# Patient Record
Sex: FEMALE | Race: WHITE | NOT HISPANIC OR LATINO | Employment: OTHER | URBAN - METROPOLITAN AREA
[De-identification: names, ages, dates, MRNs, and addresses within clinical notes are randomized per-mention and may not be internally consistent; named-entity substitution may affect disease eponyms.]

---

## 2017-03-30 ENCOUNTER — TRANSCRIBE ORDERS (OUTPATIENT)
Dept: ADMINISTRATIVE | Facility: HOSPITAL | Age: 82
End: 2017-03-30

## 2017-03-30 DIAGNOSIS — Z12.31 SCREENING MAMMOGRAM FOR HIGH-RISK PATIENT: Primary | ICD-10-CM

## 2017-05-23 ENCOUNTER — HOSPITAL ENCOUNTER (OUTPATIENT)
Dept: MAMMOGRAPHY | Facility: MEDICAL CENTER | Age: 82
Discharge: HOME | End: 2017-05-23
Payer: MEDICARE

## 2017-05-23 DIAGNOSIS — Z12.31 ENCOUNTER FOR SCREENING MAMMOGRAM FOR MALIGNANT NEOPLASM OF BREAST: ICD-10-CM

## 2017-05-23 PROCEDURE — G0202 SCR MAMMO BI INCL CAD: HCPCS

## 2017-06-01 ENCOUNTER — ALLSCRIPTS OFFICE VISIT (OUTPATIENT)
Dept: OTHER | Facility: OTHER | Age: 82
End: 2017-06-01

## 2017-07-26 ENCOUNTER — GENERIC CONVERSION - ENCOUNTER (OUTPATIENT)
Dept: OTHER | Facility: OTHER | Age: 82
End: 2017-07-26

## 2017-08-08 ENCOUNTER — GENERIC CONVERSION - ENCOUNTER (OUTPATIENT)
Dept: OTHER | Facility: OTHER | Age: 82
End: 2017-08-08

## 2018-01-12 NOTE — MISCELLANEOUS
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)   8  Lichen sclerosus (384 8) (L90 0)   9  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)   10  Pelvic Fracture (808 8)   11  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)   12  Post-menopause atrophic vaginitis (627 3) (N95 2)   13  Screening mammogram for high-risk patient (V76 11) (Z12 31)   14  Stress Incontinence (788 39)   15  Thyroid disorder (246 9) (E07 9)   16  Urinary incontinence (788 30) (R32)   17  Urinary tract infection (599 0) (N39 0)   18  Vaginal candidiasis (112 1) (B37 3)   19  Visit for screening mammogram (V76 12) (Z12 31)   20  Vulvar atrophy (624 1) (N90 5)    Current Meds   1  Adult Aspirin Low Strength 81 MG TBDP Recorded   2  Calcium Magnesium 750 TABS Recorded   3  Clobetasol Propionate 0 05 % External Cream; APPLY SPARINGLY TO AFFECTED   AREA(S) 3 TIMES A DAY; Therapy: 43SYB0656 to (Evaluate:03Jun2015); Last EV:[id number] Ordered   4  Desoximetasone 0 25 % External Cream (Topicort); APPLY AND GENTLY MASSAGE   INTO AFFECTED AREA(S) TWICE DAILY FOR 2 WEEKS ON AND ONE WEEK   OFF AS DIRECTED; Therapy: 08WGB4792 to (Last Rx:[id number])  Requested for: 86AHX5048 Ordered   5  Estrace 0 1 MG/GM Vaginal Cream; Insert 0 5 - 1 gm intravaginally 2 -3x/week as   directed; Therapy: 42NZV1184 to (Evaluate:13May2017)  Requested for: 44OGE9887; Last   Rx:18May2016 Ordered   6  Levothyroxine Sodium 75 MCG Oral Tablet Recorded   7  Lyrica 20 MG/ML Oral Solution; Therapy: 16QON6596 to (Evaluate:01Jun2014) Recorded   8  MiraLax Oral Packet (Polyethylene Glycol 3350) Recorded   9  Terconazole 0 4 % Vaginal Cream; INSERT 1 APPLICATORFUL INTRAVAGINALLY AT   BEDTIME; Therapy: 64BVW9767 to (Evaluate:18Apr2016)  Requested for: 49Cfs5049; Last   Rx:[id number] Ordered   10  Ultra Tiara-Time Oral Tablet Recorded   11  VESIcare 5 MG Oral Tablet; take 1 tablet by mouth daily; Therapy: 18AXZ1018 to (Evaluate:39Hhv2995)  Requested for: 35RVV9494; Last    Rx:[id number] Ordered   12  Vitamin B12 3000 MCG/ML Sublingual Liquid Recorded    Allergies    1  Penicillins    Plan  Vaginal candidiasis    · Terconazole 0 4 % Vaginal Cream (Terazol 7);  INSERT 1 APPLICATORFUL  INTRAVAGINALLY AT BEDTIME    Signatures   Electronically signed by : Jane Chauhan, ; Oct 12 2016  1:23PM EST                       (Author)

## 2018-01-12 NOTE — MISCELLANEOUS
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)   8  Lichen sclerosus (259 6) (L90 0)   9  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)   10  Pelvic Fracture (808 8)   11  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)   12  Post-menopause atrophic vaginitis (627 3) (N95 2)   13  Screening mammogram for high-risk patient (V76 11) (Z12 31)   14  Stress Incontinence (788 39)   15  Thyroid disorder (246 9) (E07 9)   16  Urinary incontinence (788 30) (R32)   17  Urinary tract infection (599 0) (N39 0)   18  Vaginal candidiasis (112 1) (B37 3)   19  Visit for screening mammogram (V76 12) (Z12 31)   20  Vulvar atrophy (624 1) (N90 5)    Current Meds   1  Adult Aspirin Low Strength 81 MG TBDP Recorded   2  Calcium Magnesium 750 TABS Recorded   3  Clobetasol Propionate 0 05 % External Cream; APPLY SPARINGLY TO AFFECTED   AREA(S) 3 TIMES A DAY; Therapy: 78AUL6443 to (Evaluate:03Jun2015); Last LB:[id number] Ordered   4  Desoximetasone 0 25 % External Cream (Topicort); APPLY AND GENTLY MASSAGE   INTO AFFECTED AREA(S) TWICE DAILY FOR 2 WEEKS ON AND ONE WEEK   OFF AS DIRECTED; Therapy: 81ZLG5662 to (Last Rx:[id number])  Requested for: 85KVC2230 Ordered   5  Levothyroxine Sodium 75 MCG Oral Tablet Recorded   6  Premarin 0 625 MG/GM Vaginal Cream; Insert 1/2 applicatorful  vaginally once a week   Recorded   7  Senokot TABS Recorded   8   Terconazole 0 4 % Vaginal Cream (Terazol 7); INSERT 1 APPLICATORFUL   INTRAVAGINALLY AT BEDTIME NIGHTLY; Therapy: 28IFI8119 to (Evaluate:08Jun2017)  Requested for: 58AMV6979; Last   Rx:01Jun2017 Ordered   9  Ultra Tiara-Time Oral Tablet Recorded   10  VESIcare 5 MG Oral Tablet; TAKE 1 TABLET DAILY AS DIRECTED Recorded   11  Vitamin B12 3000 MCG/ML Sublingual Liquid Recorded    Allergies    1   Penicillins    Plan  Vaginal candidiasis    · Fluconazole 150 MG Oral Tablet; tAKE ONE TABLET NOW AND THEN REPEAT IN  3 DAYS    Signatures   Electronically signed by : Tanya Tamayo, ; Jul 26 2017  1:26PM EST                       (Author)

## 2018-01-13 VITALS
WEIGHT: 122.5 LBS | SYSTOLIC BLOOD PRESSURE: 136 MMHG | DIASTOLIC BLOOD PRESSURE: 62 MMHG | BODY MASS INDEX: 20.92 KG/M2 | HEIGHT: 64 IN

## 2018-01-16 NOTE — MISCELLANEOUS
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 81)   4  Encounter for routine gynecological examination with Papanicolaou smear of cervix   (V72 31,V76 2) (Z01 419)   5  Encounter for screening mammogram for malignant neoplasm of breast (V76 12)   (Z12 31)   6  History of allergy (V15 09) (Z88 9)   7  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)   8  Lichen sclerosus (774 6) (L90 0)   9  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)   10  Pelvic Fracture (808 8)   11  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)   12  Post-menopause atrophic vaginitis (627 3) (N95 2)   13  Screening mammogram for high-risk patient (V76 11) (Z12 31)   14  Stress Incontinence (788 39)   15  Thyroid disorder (246 9) (E07 9)   16  Urinary incontinence (788 30) (R32)   17  Urinary tract infection (599 0) (N39 0)   18  Vaginal candidiasis (112 1) (B37 3)   19  Visit for screening mammogram (V76 12) (Z12 31)   20  Vulvar atrophy (624 1) (N90 5)    Current Meds   1  Adult Aspirin Low Strength 81 MG TBDP Recorded   2  Calcium Magnesium 750 TABS Recorded   3  Clobetasol Propionate 0 05 % External Cream; APPLY SPARINGLY TO AFFECTED   AREA(S) 3 TIMES A DAY; Therapy: 01AXX8582 to (Evaluate:03Jun2015); Last TN:[id number] Ordered   4  Desoximetasone 0 25 % External Cream (Topicort); APPLY AND GENTLY MASSAGE   INTO AFFECTED AREA(S) TWICE DAILY FOR 2 WEEKS ON AND ONE WEEK   OFF AS DIRECTED; Therapy: 52ABD8548 to (Last Rx:[id number])  Requested for: 24BQO4119 Ordered   5  Fluconazole 150 MG Oral Tablet; tAKE ONE TABLET NOW AND THEN REPEAT IN 3   DAYS;    Therapy: 28JBZ3100 to (Evaluate:61Njd3448)  Requested for: 05Ahg0823; Last   Rx:[id number] Ordered   6  Levothyroxine Sodium 75 MCG Oral Tablet Recorded   7  Premarin 0 625 MG/GM Vaginal Cream; Insert 1/2 applicatorful  vaginally once a week   Recorded   8  Senokot TABS Recorded   9  Terconazole 0 4 % Vaginal Cream (Terazol 7); INSERT 1 APPLICATORFUL   INTRAVAGINALLY AT BEDTIME NIGHTLY; Therapy: 04HVE5940 to (Evaluate:08Jun2017)  Requested for: 32UWD3970; Last   Rx:01Jun2017 Ordered   10  Ultra Tiara-Time Oral Tablet Recorded   11  VESIcare 5 MG Oral Tablet; TAKE 1 TABLET DAILY AS DIRECTED Recorded   12  Vitamin B12 3000 MCG/ML Sublingual Liquid Recorded    Allergies    1   Penicillins    Plan  Lichen sclerosus    · Clobetasol Propionate 0 05 % External Cream; APPLY SPARINGLY TO  AFFECTED AREA(S) 3 TIMES A DAY    Signatures   Electronically signed by : Tanya Tamayo, ; Aug  8 2017 12:52PM EST                       (Author)

## 2018-03-02 DIAGNOSIS — N89.8 VAGINAL DISCHARGE: Primary | ICD-10-CM

## 2018-03-02 DIAGNOSIS — N90.89 VULVAR IRRITATION: ICD-10-CM

## 2018-03-05 DIAGNOSIS — N32.81 OVERACTIVE BLADDER: Primary | ICD-10-CM

## 2018-03-05 RX ORDER — SOLIFENACIN SUCCINATE 5 MG/1
TABLET, FILM COATED ORAL
Qty: 90 TABLET | Refills: 3 | Status: SHIPPED | OUTPATIENT
Start: 2018-03-05 | End: 2019-01-12 | Stop reason: SDUPTHER

## 2018-03-27 ENCOUNTER — OFFICE VISIT (OUTPATIENT)
Dept: GYNECOLOGY | Facility: CLINIC | Age: 83
End: 2018-03-27
Payer: MEDICARE

## 2018-03-27 VITALS — DIASTOLIC BLOOD PRESSURE: 80 MMHG | SYSTOLIC BLOOD PRESSURE: 130 MMHG

## 2018-03-27 DIAGNOSIS — B37.3 CANDIDIASIS OF VULVA AND VAGINA: Primary | ICD-10-CM

## 2018-03-27 PROCEDURE — 87210 SMEAR WET MOUNT SALINE/INK: CPT | Performed by: OBSTETRICS & GYNECOLOGY

## 2018-03-27 PROCEDURE — 99213 OFFICE O/P EST LOW 20 MIN: CPT | Performed by: OBSTETRICS & GYNECOLOGY

## 2018-03-27 RX ORDER — SENNA PLUS 8.6 MG/1
TABLET ORAL
COMMUNITY

## 2018-03-27 RX ORDER — CYANOCOBALAMIN (VITAMIN B-12) 1000 MCG
TABLET, SUBLINGUAL SUBLINGUAL
COMMUNITY

## 2018-03-27 RX ORDER — LEVOTHYROXINE SODIUM 0.05 MG/1
TABLET ORAL
COMMUNITY
Start: 2018-03-19

## 2018-03-27 RX ORDER — FLUCONAZOLE 150 MG/1
TABLET ORAL
Qty: 2 TABLET | Refills: 0 | Status: SHIPPED | OUTPATIENT
Start: 2018-03-27 | End: 2018-03-29

## 2018-03-27 RX ORDER — ESTRADIOL 0.1 MG/G
CREAM VAGINAL
COMMUNITY
Start: 2018-02-22 | End: 2019-06-04 | Stop reason: SDUPTHER

## 2018-03-27 RX ORDER — CLOBETASOL PROPIONATE 0.5 MG/G
CREAM TOPICAL 3 TIMES DAILY
COMMUNITY
Start: 2015-06-02 | End: 2018-09-12 | Stop reason: SDUPTHER

## 2018-03-27 RX ORDER — SOLIFENACIN SUCCINATE 5 MG/1
1 TABLET, FILM COATED ORAL DAILY
COMMUNITY
End: 2020-04-08 | Stop reason: ALTCHOICE

## 2018-03-27 RX ORDER — MONTELUKAST SODIUM 10 MG/1
TABLET ORAL
COMMUNITY
Start: 2018-02-25

## 2018-03-27 RX ORDER — CLOTRIMAZOLE AND BETAMETHASONE DIPROPIONATE 10; .64 MG/G; MG/G
CREAM TOPICAL 2 TIMES DAILY
Qty: 30 G | Refills: 0 | Status: SHIPPED | OUTPATIENT
Start: 2018-03-27 | End: 2020-06-11 | Stop reason: SDUPTHER

## 2018-03-27 RX ORDER — AZELASTINE HCL 205.5 UG/1
SPRAY NASAL
COMMUNITY
Start: 2018-03-05

## 2018-03-27 NOTE — PROGRESS NOTES
And patient presents complaining of vulvar irritation and a vaginal discharge over the past 6 weeks  This is intermittent  Denies any dysuria hematuria abdominal pain or fever  She has had a prior POLLO BSO several years ago  She has a history of interstitial cystitis the now off medications  She also has a history of detrusor instability she is presently on VESIcare  She has history of lichen sclerosis  When this flare she uses Valisone with Eurax  She also has vaginal atrophy  She uses Estrace twice weekly for this  With this recent irritation and discharge she trialed Terazol 7 with minimal to no relief  Physical exam abdomen is soft nontender no adenopathy  On pelvic exam the uterus and cervix are absent there are no adnexal masses  Vaginal tissue looks well estrogenized  There is a discharge present  Wet mount reveals high Fe consistent with a candidiasis  The labia are erythematous      Impression vulvar vaginal candidiasis    Plan:  Diflucan 1 tablet now; repeat 48 hours , and Lotrisone applied topically b i d  for 7 days

## 2018-04-27 ENCOUNTER — TRANSCRIBE ORDERS (OUTPATIENT)
Dept: ADMINISTRATIVE | Facility: HOSPITAL | Age: 83
End: 2018-04-27

## 2018-04-27 DIAGNOSIS — Z12.39 SCREENING BREAST EXAMINATION: Primary | ICD-10-CM

## 2018-05-10 ENCOUNTER — OFFICE VISIT (OUTPATIENT)
Dept: GYNECOLOGY | Facility: CLINIC | Age: 83
End: 2018-05-10
Payer: MEDICARE

## 2018-05-10 VITALS
BODY MASS INDEX: 21.58 KG/M2 | HEIGHT: 64 IN | DIASTOLIC BLOOD PRESSURE: 60 MMHG | WEIGHT: 126.4 LBS | SYSTOLIC BLOOD PRESSURE: 140 MMHG

## 2018-05-10 DIAGNOSIS — N95.2 ATROPHIC VAGINITIS: Primary | ICD-10-CM

## 2018-05-10 DIAGNOSIS — B37.9 CANDIDIASIS: ICD-10-CM

## 2018-05-10 DIAGNOSIS — R39.9 URINARY TRACT INFECTION SYMPTOMS: ICD-10-CM

## 2018-05-10 LAB
SL AMB  POCT GLUCOSE, UA: NORMAL
SL AMB LEUKOCYTE ESTERASE,UA: NORMAL
SL AMB POCT BILIRUBIN,UA: NORMAL
SL AMB POCT BLOOD,UA: NORMAL
SL AMB POCT CLARITY,UA: CLEAR
SL AMB POCT COLOR,UA: YELLOW
SL AMB POCT KETONES,UA: NORMAL
SL AMB POCT NITRITE,UA: NORMAL
SL AMB POCT PH,UA: 7
SL AMB POCT SPECIFIC GRAVITY,UA: 1
SL AMB POCT URINE PROTEIN: NORMAL
SL AMB POCT UROBILINOGEN: NORMAL

## 2018-05-10 PROCEDURE — 81002 URINALYSIS NONAUTO W/O SCOPE: CPT | Performed by: OBSTETRICS & GYNECOLOGY

## 2018-05-10 PROCEDURE — 99213 OFFICE O/P EST LOW 20 MIN: CPT | Performed by: OBSTETRICS & GYNECOLOGY

## 2018-05-10 RX ORDER — LIDOCAINE 50 MG/G
OINTMENT TOPICAL 3 TIMES DAILY PRN
Qty: 35.44 G | Refills: 0 | Status: SHIPPED | OUTPATIENT
Start: 2018-05-10 | End: 2020-04-08 | Stop reason: ALTCHOICE

## 2018-05-10 RX ORDER — FLUCONAZOLE 150 MG/1
TABLET ORAL
Qty: 4 TABLET | Refills: 0 | Status: SHIPPED | OUTPATIENT
Start: 2018-05-10 | End: 2018-05-19

## 2018-05-10 NOTE — PROGRESS NOTES
Presents today complaining of significant vaginal burning and irritation  She was seen on March 27th and was treated for candidiasis with the Diflucan x2 and Lotrisone  Patient has had a prior POLLO BS O  She has a history of interstitial cystitis, lichen sclerosis, and vaginal atrophy  She uses Estrace 1 g vaginally 2 times per week  She denies any abdominal pain fever vaginal bleeding  She does admit to discharge  Physical exam:  Abdomen is soft and nontender with no masses appreciated  Pelvic exam:  Uterus and cervix are absent there are no palpable adnexal masses  There is a discharge present consistent with candidiasis  A sugar swab culture was obtained  Vaginal tissue appears well estrogenized  Impression vulvar vaginal candidiasis    Plan Diflucan 1 tablet every 3rd day for 4 doses  Topical lidocaine p r n     We will await the results of the Essure swab if the cultures returned as negative and the patient continues to have discomfort would recommend consultation at [facility]

## 2018-05-14 LAB
A VAGINAE DNA VAG NAA+PROBE-LOG#: NOT DETECTED LOG (CELLS/ML)
C GLABRATA DNA VAG QL NAA+PROBE: NOT DETECTED
C TRACH RRNA SPEC QL NAA+PROBE: NOT DETECTED
CANDIDA DNA VAG QL NAA+PROBE: NOT DETECTED
G VAGINALIS DNA VAG NAA+PROBE-LOG#: NOT DETECTED LOG (CELLS/ML)
LACTOBACILLUS DNA VAG NAA+PROBE-LOG#: 7 LOG (CELLS/ML)
MEGASPHAERA SP DNA VAG NAA+PROBE-LOG#: NOT DETECTED LOG (CELLS/ML)
N GONORRHOEA RRNA SPEC QL NAA+PROBE: NOT DETECTED
SL AMB BV CATEGORY:: NORMAL
SL AMB C. PARAPSILOSIS, DNA: NOT DETECTED
SL AMB C. TROPICALIS, DNA: NOT DETECTED
T VAGINALIS RRNA SPEC QL NAA+PROBE: NOT DETECTED

## 2018-05-24 ENCOUNTER — HOSPITAL ENCOUNTER (OUTPATIENT)
Dept: MAMMOGRAPHY | Facility: MEDICAL CENTER | Age: 83
Discharge: HOME | End: 2018-05-24
Payer: MEDICARE

## 2018-05-24 DIAGNOSIS — Z12.39 SCREENING BREAST EXAMINATION: ICD-10-CM

## 2018-05-24 PROCEDURE — 77067 SCR MAMMO BI INCL CAD: CPT

## 2018-09-10 ENCOUNTER — DOCUMENTATION (OUTPATIENT)
Dept: GYNECOLOGY | Facility: CLINIC | Age: 83
End: 2018-09-10

## 2018-09-10 NOTE — PROGRESS NOTES
Pt called c/o vaginal soreness and irritation urine burms, slighrt yellow d/c and slight odor  Spoke to [place] she advised trying the Valisone Eurac cream for several days and call if no better       Taran Bernal LPN

## 2018-09-12 DIAGNOSIS — L90.0 LICHEN SCLEROSUS: Primary | ICD-10-CM

## 2018-09-12 RX ORDER — CLOBETASOL PROPIONATE 0.5 MG/G
CREAM TOPICAL 2 TIMES DAILY
Qty: 30 G | Refills: 1 | Status: SHIPPED | OUTPATIENT
Start: 2018-09-12 | End: 2019-06-04 | Stop reason: HOSPADM

## 2018-10-02 ENCOUNTER — OFFICE VISIT (OUTPATIENT)
Dept: GYNECOLOGY | Facility: CLINIC | Age: 83
End: 2018-10-02
Payer: MEDICARE

## 2018-10-02 VITALS
HEIGHT: 64 IN | SYSTOLIC BLOOD PRESSURE: 130 MMHG | BODY MASS INDEX: 20.59 KG/M2 | WEIGHT: 120.6 LBS | DIASTOLIC BLOOD PRESSURE: 70 MMHG

## 2018-10-02 DIAGNOSIS — N76.0 VAGINITIS AND VULVOVAGINITIS: Primary | ICD-10-CM

## 2018-10-02 PROCEDURE — 99213 OFFICE O/P EST LOW 20 MIN: CPT | Performed by: OBSTETRICS & GYNECOLOGY

## 2018-10-02 RX ORDER — FLUCONAZOLE 150 MG/1
TABLET ORAL
Qty: 4 TABLET | Refills: 0 | Status: SHIPPED | OUTPATIENT
Start: 2018-10-02 | End: 2018-10-11

## 2018-10-02 NOTE — PROGRESS NOTES
Patient complete presents today complaining of vaginal burning for the past 6 weeks  She has a history of chronic vaginitis lichen sclerosis and atrophic vaginitis  She is presently on Estrace vaginal cream 1 g weekly  She was last seen in May with the vaginal burning and appeared to be secondary to candidiasis was placed on Diflucan  The sureswab culture at that time however came back negative  Patient has had a prior POLLO BSO   she denies any vaginal discharge or bleeding, abdominal pain, or fever ,or hematuria  On exam there is vaginal discharge again consistent with candidiasis  A culture was obtained  And the vaginal tissue appears to be well estrogenized       Impression:  Vaginitis :suspected candidiasis    Plan Diflucan 1 tablet every 3rd day for 4 doses if no improvement with this I would recommend consultation at [facility] with Dr Darlene Mims

## 2018-10-05 LAB
A VAGINAE DNA VAG NAA+PROBE-LOG#: NOT DETECTED LOG (CELLS/ML)
C GLABRATA DNA VAG QL NAA+PROBE: NOT DETECTED
C TRACH RRNA SPEC QL NAA+PROBE: NOT DETECTED
CANDIDA DNA VAG QL NAA+PROBE: DETECTED
G VAGINALIS DNA VAG NAA+PROBE-LOG#: NOT DETECTED LOG (CELLS/ML)
LACTOBACILLUS DNA VAG NAA+PROBE-LOG#: 7.4 LOG (CELLS/ML)
MEGASPHAERA SP DNA VAG NAA+PROBE-LOG#: NOT DETECTED LOG (CELLS/ML)
N GONORRHOEA RRNA SPEC QL NAA+PROBE: NOT DETECTED
SL AMB BV CATEGORY:: ABNORMAL
SL AMB C. PARAPSILOSIS, DNA: NOT DETECTED
SL AMB C. TROPICALIS, DNA: NOT DETECTED
T VAGINALIS RRNA SPEC QL NAA+PROBE: NOT DETECTED

## 2019-01-12 DIAGNOSIS — N32.81 OVERACTIVE BLADDER: ICD-10-CM

## 2019-01-13 RX ORDER — SOLIFENACIN SUCCINATE 5 MG/1
TABLET, FILM COATED ORAL
Qty: 90 TABLET | Refills: 3 | Status: SHIPPED | OUTPATIENT
Start: 2019-01-13 | End: 2019-06-04 | Stop reason: SDUPTHER

## 2019-03-27 ENCOUNTER — TRANSCRIBE ORDERS (OUTPATIENT)
Dept: ADMINISTRATIVE | Facility: HOSPITAL | Age: 84
End: 2019-03-27

## 2019-03-27 DIAGNOSIS — Z12.39 SCREENING BREAST EXAMINATION: Primary | ICD-10-CM

## 2019-05-28 ENCOUNTER — HOSPITAL ENCOUNTER (OUTPATIENT)
Dept: MAMMOGRAPHY | Facility: MEDICAL CENTER | Age: 84
Discharge: HOME | End: 2019-05-28
Payer: MEDICARE

## 2019-05-28 VITALS — WEIGHT: 120 LBS | HEIGHT: 65 IN | BODY MASS INDEX: 19.99 KG/M2

## 2019-05-28 DIAGNOSIS — Z12.39 SCREENING BREAST EXAMINATION: ICD-10-CM

## 2019-05-28 PROCEDURE — 77067 SCR MAMMO BI INCL CAD: CPT

## 2019-06-04 ENCOUNTER — ANNUAL EXAM (OUTPATIENT)
Dept: GYNECOLOGY | Facility: CLINIC | Age: 84
End: 2019-06-04
Payer: MEDICARE

## 2019-06-04 VITALS
DIASTOLIC BLOOD PRESSURE: 60 MMHG | HEIGHT: 64 IN | BODY MASS INDEX: 20.83 KG/M2 | SYSTOLIC BLOOD PRESSURE: 100 MMHG | WEIGHT: 122 LBS | HEART RATE: 73 BPM

## 2019-06-04 DIAGNOSIS — N95.2 ATROPHIC VAGINITIS: Primary | ICD-10-CM

## 2019-06-04 DIAGNOSIS — Z01.419 ENCOUNTER FOR GYNECOLOGICAL EXAMINATION WITHOUT ABNORMAL FINDING: ICD-10-CM

## 2019-06-04 DIAGNOSIS — N32.81 OAB (OVERACTIVE BLADDER): ICD-10-CM

## 2019-06-04 DIAGNOSIS — N32.81 OVERACTIVE BLADDER: ICD-10-CM

## 2019-06-04 PROCEDURE — G0101 CA SCREEN;PELVIC/BREAST EXAM: HCPCS | Performed by: OBSTETRICS & GYNECOLOGY

## 2019-06-04 RX ORDER — SOLIFENACIN SUCCINATE 5 MG/1
5 TABLET, FILM COATED ORAL DAILY
Qty: 90 TABLET | Refills: 3 | Status: SHIPPED | OUTPATIENT
Start: 2019-06-04 | End: 2020-04-08 | Stop reason: ALTCHOICE

## 2019-06-04 RX ORDER — ESTRADIOL 0.1 MG/G
1 CREAM VAGINAL WEEKLY
Qty: 42.5 G | Refills: 3 | Status: SHIPPED | OUTPATIENT
Start: 2019-06-04 | End: 2020-11-11 | Stop reason: SDUPTHER

## 2020-03-23 ENCOUNTER — OFFICE VISIT (OUTPATIENT)
Dept: URGENT CARE | Age: 85
End: 2020-03-23
Payer: MEDICARE

## 2020-03-23 VITALS
DIASTOLIC BLOOD PRESSURE: 84 MMHG | OXYGEN SATURATION: 98 % | HEART RATE: 84 BPM | SYSTOLIC BLOOD PRESSURE: 110 MMHG | BODY MASS INDEX: 20.83 KG/M2 | TEMPERATURE: 98.8 F | HEIGHT: 64 IN | WEIGHT: 122 LBS

## 2020-03-23 DIAGNOSIS — R06.02 SHORTNESS OF BREATH: ICD-10-CM

## 2020-03-23 DIAGNOSIS — R06.02 SOB (SHORTNESS OF BREATH): Primary | ICD-10-CM

## 2020-03-23 PROCEDURE — 87635 SARS-COV-2 COVID-19 AMP PRB: CPT | Performed by: NURSE PRACTITIONER

## 2020-03-23 PROCEDURE — 99213 OFFICE O/P EST LOW 20 MIN: CPT | Performed by: NURSE PRACTITIONER

## 2020-03-23 PROCEDURE — 87631 RESP VIRUS 3-5 TARGETS: CPT | Performed by: NURSE PRACTITIONER

## 2020-03-23 PROCEDURE — G0463 HOSPITAL OUTPT CLINIC VISIT: HCPCS | Performed by: NURSE PRACTITIONER

## 2020-03-23 RX ORDER — AZITHROMYCIN 250 MG/1
TABLET, FILM COATED ORAL
COMMUNITY
Start: 2020-03-16 | End: 2020-04-08 | Stop reason: ALTCHOICE

## 2020-03-23 NOTE — PATIENT INSTRUCTIONS
Pt aware to follow up with pcp   Aware to continue quarintine at home as directed   Will call with results call your pcp if anything worsens      COVID-19 Home Care Guidelines    Your healthcare provider and/or public health staff have evaluated you and have determined that you do not need to remain in the hospital at this time  At this time you can be isolated at home where you will be monitored by staff from your local or state health department  You should carefully follow the prevention and isolation steps below until a healthcare provider or local or state health department says that you can return to your normal activities  Stay home except to get medical care    People who are mildly ill with COVID-19 are able to isolate at home during their illness  You should restrict activities outside your home, except for getting medical care  Do not go to work, school, or public areas  Avoid using public transportation, ride-sharing, or taxis  Separate yourself from other people and animals in your home    People: As much as possible, you should stay in a specific room and away from other people in your home  Also, you should use a separate bathroom, if available  Animals: You should restrict contact with pets and other animals while you are sick with COVID-19, just like you would around other people  Although there have not been reports of pets or other animals becoming sick with COVID-19, it is still recommended that people sick with COVID-19 limit contact with animals until more information is known about the virus  When possible, have another member of your household care for your animals while you are sick  If you are sick with COVID-19, avoid contact with your pet, including petting, snuggling, being kissed or licked, and sharing food  If you must care for your pet or be around animals while you are sick, wash your hands before and after you interact with pets and wear a facemask   See COVID-19 and Animals for more information  Call ahead before visiting your doctor    If you have a medical appointment, call the healthcare provider and tell them that you have or may have COVID-19  This will help the healthcare providers office take steps to keep other people from getting infected or exposed  Wear a facemask    You should wear a facemask when you are around other people (e g , sharing a room or vehicle) or pets and before you enter a healthcare providers office  If you are not able to wear a facemask (for example, because it causes trouble breathing), then people who live with you should not stay in the same room with you, or they should wear a facemask if they enter your room  Cover your coughs and sneezes    Cover your mouth and nose with a tissue when you cough or sneeze  Throw used tissues in a lined trash can  Immediately wash your hands with soap and water for at least 20 seconds or, if soap and water are not available, clean your hands with an alcohol-based hand  that contains at least 60% alcohol  Clean your hands often    Wash your hands often with soap and water for at least 20 seconds, especially after blowing your nose, coughing, or sneezing; going to the bathroom; and before eating or preparing food  If soap and water are not readily available, use an alcohol-based hand  with at least 60% alcohol, covering all surfaces of your hands and rubbing them together until they feel dry  Soap and water are the best option if hands are visibly dirty  Avoid touching your eyes, nose, and mouth with unwashed hands  Avoid sharing personal household items    You should not share dishes, drinking glasses, cups, eating utensils, towels, or bedding with other people or pets in your home  After using these items, they should be washed thoroughly with soap and water      Clean all high-touch surfaces everyday    High touch surfaces include counters, tabletops, doorknobs, bathroom fixtures, toilets, phones, keyboards, tablets, and bedside tables  Also, clean any surfaces that may have blood, stool, or body fluids on them  Use a household cleaning spray or wipe, according to the label instructions  Labels contain instructions for safe and effective use of the cleaning product including precautions you should take when applying the product, such as wearing gloves and making sure you have good ventilation during use of the product  Monitor your symptoms    Seek prompt medical attention if your illness is worsening (e g , difficulty breathing)  Before seeking care, call your healthcare provider and tell them that you have, or are being evaluated for, COVID-19  Put on a facemask before you enter the facility  These steps will help the healthcare providers office to keep other people in the office or waiting room from getting infected or exposed  Ask your healthcare provider to call the local or state health department  Persons who are placed under active monitoring or facilitated self-monitoring should follow instructions provided by their local health department or occupational health professionals, as appropriate  If you have a medical emergency and need to call 911, notify the dispatch personnel that you have, or are being evaluated for COVID-19  If possible, put on a facemask before emergency medical services arrive  Discontinuing home isolation    Patients with confirmed COVID-19 should remain under home isolation precautions until the risk of secondary transmission to others is thought to be low  The decision to discontinue home isolation precautions should be made on a case-by-case basis, in consultation with healthcare providers and state and local health departments      Source: [employer]

## 2020-03-23 NOTE — PROGRESS NOTES
NAME: Natalia Scott is a 82 y o  female  : 1935    MRN: [id number]    /84   Pulse 84   Temp 98 8 °F (37 1 °C)   Ht 5' 4" (1 626 m)   Wt 55 3 kg (122 lb)   SpO2 98%   BMI 20 94 kg/m²     Assessment and Plan   SOB (shortness of breath) [R06 02]  1  SOB (shortness of breath)  MISC COVID-19 TEST- Office Collection    Influenza A/B and RSV by PCR   2  Shortness of breath         Clotilde Michelle was seen today for shortness of breath  Diagnoses and all orders for this visit:    SOB (shortness of breath)  -     MISC COVID-19 TEST- Office Collection  -     Influenza A/B and RSV by PCR    Shortness of breath        Patient Instructions   There are no Patient Instructions on file for this visit  Proceed to ER if symptoms worsen  Chief Complaint     Chief Complaint   Patient presents with    Shortness of Breath     Pt states she began yesterday with intermittent SOB and chest tightness  Denies cough or fevers  Recently treated for URI symptoms and completed Zithromax  No recent travel or exposure to positive Covid patient  History of Present Illness     83 yo female here today with sob and cough for the past few days  She finished z-anita Friday and yesterday she developed with mild SOB and CP, tightness which is intermittent  Worse when laying down  She has no other symptoms  She has some body aches and fatigue for about two weeks ago, she has had a flu shot this season as well  Currently at time of visit she doesn't have any symptoms and admits that she just wants to be sure that she doesn't have the COVID virus  Discussed with pt that she has no cough currently, no fever but due to her age and risks, and tightness that intermittently comes and goes I would test her for it  She denies having any numbness or tingling to the body and has no other symptoms  Pt is sitting down in chair across the room and reading a book with no discomfort   Patient was sent to the office by her PCP to have COVID testing and no order was placed in  Review of Systems   Review of Systems   Constitutional: Negative for chills and fever  HENT: Positive for congestion, sinus pressure and sinus pain  Negative for ear pain, postnasal drip and sore throat  Eyes: Negative  Respiratory: Positive for cough and chest tightness  Gastrointestinal: Negative  Musculoskeletal: Negative            Current Medications       Current Outpatient Medications:     Aspirin 81 MG EC tablet, Take by mouth, Disp: , Rfl:     Azelastine HCl 0 15 % SOLN, , Disp: , Rfl:     Calcium-Magnesium (CALCIUM MAGNESIUM 750) 300-300 MG TABS, Take by mouth, Disp: , Rfl:     Cyanocobalamin (VITAMIN B-12) 500 MCG SUBL, Place under the tongue, Disp: , Rfl:     estradiol (ESTRACE) 0 1 mg/g vaginal cream, Insert 1 g into the vagina once a week, Disp: 42 5 g, Rfl: 3    levothyroxine 50 mcg tablet, , Disp: , Rfl:     Methylcellulose, Laxative, (CITRUCEL PO), Take by mouth, Disp: , Rfl:     montelukast (SINGULAIR) 10 mg tablet, , Disp: , Rfl:     senna (SENOKOT) 8 6 MG tablet, Take by mouth, Disp: , Rfl:     solifenacin (VESICARE) 5 mg tablet, Take 1 tablet by mouth daily, Disp: , Rfl:     solifenacin (VESICARE) 5 mg tablet, Take 1 tablet (5 mg total) by mouth daily, Disp: 90 tablet, Rfl: 3    azithromycin (ZITHROMAX) 250 mg tablet, , Disp: , Rfl:     clotrimazole-betamethasone (LOTRISONE) 1-0 05 % cream, Apply topically 2 (two) times a day (Patient not taking: Reported on 6/4/2019), Disp: 30 g, Rfl: 0    Lactobacillus (EQL DIGESTIVE PROBIOTIC PO), Take by mouth, Disp: , Rfl:     lidocaine (XYLOCAINE) 5 % ointment, Apply topically 3 (three) times a day as needed for mild pain (Patient not taking: Reported on 10/2/2018 ), Disp: 35 44 g, Rfl: 0    Current Allergies     Allergies as of 03/23/2020 - Reviewed 03/23/2020   Allergen Reaction Noted    Cephalexin Nausea Only and Vomiting 06/04/2019    Codeine Other (See Comments) 01/17/2008    Meperidine  01/17/2008    Other Other (See Comments) 01/18/2008    Penicillins  01/17/2008              Past Medical History:   Diagnosis Date    Allergic     Asthma     Chronic interstitial cystitis     Disease of thyroid gland     Lichen sclerosus et atrophicus        Past Surgical History:   Procedure Laterality Date    APPENDECTOMY      BLADDER SURGERY      CATARACT EXTRACTION      COLONOSCOPY      INCISIONAL BREAST BIOPSY Bilateral     3 excisional rt breast 4 left breast all bening many years ago    KNEE SURGERY      REPLACEMENT TOTAL KNEE      TOTAL ABDOMINAL HYSTERECTOMY  1972       Family History   Problem Relation Age of Onset    Heart attack Mother    Minesh Hoobed Breast cancer Mother         over 48    Heart disease Father     Lung cancer Father     Breast cancer Cousin         under 48    No Known Problems Sister     No Known Problems Daughter     No Known Problems Maternal Grandmother     No Known Problems Maternal Grandfather     No Known Problems Paternal Grandmother     No Known Problems Paternal Grandfather     No Known Problems Maternal Aunt          Medications have been verified  The following portions of the patient's history were reviewed and updated as appropriate: allergies, current medications, past family history, past medical history, past social history, past surgical history and problem list     Objective   /84   Pulse 84   Temp 98 8 °F (37 1 °C)   Ht 5' 4" (1 626 m)   Wt 55 3 kg (122 lb)   SpO2 98%   BMI 20 94 kg/m²      Physical Exam     Physical Exam   Constitutional: She is oriented to person, place, and time  She appears well-developed and well-nourished  She is cooperative  She does not appear ill  No distress  HENT:   Head: Normocephalic  Right Ear: Hearing and tympanic membrane normal    Left Ear: Hearing and tympanic membrane normal    Nose: Mucosal edema present     Mouth/Throat: Uvula is midline, oropharynx is clear and moist and mucous membranes are normal  No posterior oropharyngeal edema or posterior oropharyngeal erythema  No tonsillar exudate  Neck: Normal range of motion  No spinous process tenderness and no muscular tenderness present  Normal range of motion present  Cardiovascular: Normal rate, regular rhythm and normal heart sounds  Pulmonary/Chest: Effort normal and breath sounds normal  No stridor  No respiratory distress  She has no decreased breath sounds  Lymphadenopathy:     She has no cervical adenopathy  Neurological: She is alert and oriented to person, place, and time  Skin: Skin is warm  Capillary refill takes less than 2 seconds         AFUA Denise

## 2020-03-24 LAB
FLUAV RNA NPH QL NAA+PROBE: NORMAL
FLUBV RNA NPH QL NAA+PROBE: NORMAL
RSV RNA NPH QL NAA+PROBE: NORMAL

## 2020-03-25 ENCOUNTER — TELEPHONE (OUTPATIENT)
Dept: URGENT CARE | Age: 85
End: 2020-03-25

## 2020-03-25 NOTE — TELEPHONE ENCOUNTER
Pt made aware of her negative flu results today and that the COVID testing is still pending     Jace Cockayne, CRNP

## 2020-03-28 ENCOUNTER — TELEPHONE (OUTPATIENT)
Dept: URGENT CARE | Facility: CLINIC | Age: 85
End: 2020-03-28

## 2020-03-28 LAB — SARS-COV-2 RNA SPEC QL NAA+PROBE: NOT DETECTED

## 2020-03-28 NOTE — TELEPHONE ENCOUNTER
Called pt and spoke with her directly  Pt is aware that her results are negative and was relieved     AFUA De Leon

## 2020-04-08 ENCOUNTER — OFFICE VISIT (OUTPATIENT)
Dept: GYNECOLOGY | Facility: CLINIC | Age: 85
End: 2020-04-08
Payer: MEDICARE

## 2020-04-08 VITALS
SYSTOLIC BLOOD PRESSURE: 116 MMHG | DIASTOLIC BLOOD PRESSURE: 78 MMHG | BODY MASS INDEX: 21.31 KG/M2 | WEIGHT: 124.8 LBS | HEART RATE: 74 BPM | HEIGHT: 64 IN

## 2020-04-08 DIAGNOSIS — L90.0 LICHEN SCLEROSUS: ICD-10-CM

## 2020-04-08 DIAGNOSIS — Z12.31 ENCOUNTER FOR SCREENING MAMMOGRAM FOR BREAST CANCER: Primary | ICD-10-CM

## 2020-04-08 DIAGNOSIS — B37.3 MONILIAL VULVOVAGINITIS: Primary | ICD-10-CM

## 2020-04-08 LAB
BV WHIFF TEST VAG QL: ABNORMAL
CLUE CELLS SPEC QL WET PREP: ABNORMAL
PH SMN: 4.5 [PH]
SL AMB POCT WET MOUNT: ABNORMAL
T VAGINALIS VAG QL WET PREP: ABNORMAL
YEAST VAG QL WET PREP: ABNORMAL

## 2020-04-08 PROCEDURE — 99213 OFFICE O/P EST LOW 20 MIN: CPT | Performed by: OBSTETRICS & GYNECOLOGY

## 2020-04-08 PROCEDURE — 87210 SMEAR WET MOUNT SALINE/INK: CPT | Performed by: OBSTETRICS & GYNECOLOGY

## 2020-04-08 RX ORDER — CLOBETASOL PROPIONATE 0.5 MG/G
CREAM TOPICAL WEEKLY
Qty: 30 G | Refills: 0 | Status: SHIPPED | OUTPATIENT
Start: 2020-04-08 | End: 2021-10-29 | Stop reason: SDUPTHER

## 2020-04-08 RX ORDER — FLUCONAZOLE 150 MG/1
TABLET ORAL
Qty: 2 TABLET | Refills: 0 | Status: SHIPPED | OUTPATIENT
Start: 2020-04-08 | End: 2020-04-12

## 2020-06-05 ENCOUNTER — HOSPITAL ENCOUNTER (OUTPATIENT)
Dept: MAMMOGRAPHY | Facility: MEDICAL CENTER | Age: 85
Discharge: HOME | End: 2020-06-05
Payer: MEDICARE

## 2020-06-05 VITALS — WEIGHT: 124 LBS | BODY MASS INDEX: 21.17 KG/M2 | HEIGHT: 64 IN

## 2020-06-05 DIAGNOSIS — Z12.31 ENCOUNTER FOR SCREENING MAMMOGRAM FOR BREAST CANCER: ICD-10-CM

## 2020-06-05 PROCEDURE — 77063 BREAST TOMOSYNTHESIS BI: CPT

## 2020-06-05 PROCEDURE — 77067 SCR MAMMO BI INCL CAD: CPT

## 2020-06-11 ENCOUNTER — ANNUAL EXAM (OUTPATIENT)
Dept: GYNECOLOGY | Facility: CLINIC | Age: 85
End: 2020-06-11
Payer: MEDICARE

## 2020-06-11 VITALS
SYSTOLIC BLOOD PRESSURE: 150 MMHG | BODY MASS INDEX: 20.67 KG/M2 | DIASTOLIC BLOOD PRESSURE: 70 MMHG | HEART RATE: 83 BPM | WEIGHT: 120.4 LBS

## 2020-06-11 DIAGNOSIS — B37.3 CANDIDIASIS OF VULVA AND VAGINA: Primary | ICD-10-CM

## 2020-06-11 DIAGNOSIS — B37.9 CANDIDIASIS: ICD-10-CM

## 2020-06-11 PROCEDURE — 99213 OFFICE O/P EST LOW 20 MIN: CPT | Performed by: OBSTETRICS & GYNECOLOGY

## 2020-06-11 RX ORDER — NAPROXEN SODIUM 220 MG
220 TABLET ORAL 2 TIMES DAILY WITH MEALS
COMMUNITY
End: 2022-01-28 | Stop reason: HOSPADM

## 2020-06-11 RX ORDER — CLOTRIMAZOLE AND BETAMETHASONE DIPROPIONATE 10; .64 MG/G; MG/G
CREAM TOPICAL 2 TIMES DAILY
Qty: 30 G | Refills: 0 | Status: SHIPPED | OUTPATIENT
Start: 2020-06-11 | End: 2021-09-17 | Stop reason: SDUPTHER

## 2020-06-11 RX ORDER — FLUCONAZOLE 150 MG/1
TABLET ORAL
Qty: 2 TABLET | Refills: 0 | Status: SHIPPED | OUTPATIENT
Start: 2020-06-11 | End: 2020-06-13

## 2020-11-11 DIAGNOSIS — N95.2 ATROPHIC VAGINITIS: ICD-10-CM

## 2020-11-11 RX ORDER — ESTRADIOL 0.1 MG/G
1 CREAM VAGINAL WEEKLY
Qty: 42.5 G | Refills: 3 | Status: SHIPPED | OUTPATIENT
Start: 2020-11-11 | End: 2022-06-28 | Stop reason: SDUPTHER

## 2021-05-20 ENCOUNTER — TRANSCRIBE ORDERS (OUTPATIENT)
Dept: ADMINISTRATIVE | Facility: HOSPITAL | Age: 86
End: 2021-05-20

## 2021-05-20 DIAGNOSIS — Z12.31 ENCOUNTER FOR SCREENING MAMMOGRAM FOR MALIGNANT NEOPLASM OF BREAST: Primary | ICD-10-CM

## 2021-06-17 ENCOUNTER — HOSPITAL ENCOUNTER (OUTPATIENT)
Dept: MAMMOGRAPHY | Facility: MEDICAL CENTER | Age: 86
Discharge: HOME | End: 2021-06-17
Payer: MEDICARE

## 2021-06-17 VITALS — WEIGHT: 120 LBS | HEIGHT: 65 IN | BODY MASS INDEX: 19.99 KG/M2

## 2021-06-17 DIAGNOSIS — Z12.31 ENCOUNTER FOR SCREENING MAMMOGRAM FOR MALIGNANT NEOPLASM OF BREAST: ICD-10-CM

## 2021-06-17 PROCEDURE — 77067 SCR MAMMO BI INCL CAD: CPT

## 2021-06-17 PROCEDURE — 77063 BREAST TOMOSYNTHESIS BI: CPT

## 2021-09-17 ENCOUNTER — OFFICE VISIT (OUTPATIENT)
Dept: GYNECOLOGY | Facility: CLINIC | Age: 86
End: 2021-09-17
Payer: MEDICARE

## 2021-09-17 DIAGNOSIS — B37.3 MONILIAL VULVOVAGINITIS: ICD-10-CM

## 2021-09-17 DIAGNOSIS — B37.3 CANDIDIASIS OF VULVA AND VAGINA: Primary | ICD-10-CM

## 2021-09-17 PROBLEM — E11.51 DIABETES MELLITUS WITH PERIPHERAL CIRCULATORY DISORDER (HCC): Status: ACTIVE | Noted: 2021-09-17

## 2021-09-17 PROCEDURE — 99213 OFFICE O/P EST LOW 20 MIN: CPT | Performed by: OBSTETRICS & GYNECOLOGY

## 2021-09-17 RX ORDER — FLUCONAZOLE 150 MG/1
150 TABLET ORAL ONCE
Qty: 2 TABLET | Refills: 0 | Status: SHIPPED | OUTPATIENT
Start: 2021-09-17 | End: 2021-09-17

## 2021-09-17 RX ORDER — CLOTRIMAZOLE AND BETAMETHASONE DIPROPIONATE 10; .64 MG/G; MG/G
CREAM TOPICAL 2 TIMES DAILY
Qty: 30 G | Refills: 0 | Status: SHIPPED | OUTPATIENT
Start: 2021-09-17

## 2021-09-17 NOTE — PROGRESS NOTES
Assessment/Plan:    Will treat with oral Diflucan day 1 and 4 as well as lotrisone cream bid for 1-2 weeks  Lotrisone has worked for patient in the past      Diagnoses and all orders for this visit:    Candidiasis of vulva and vagina  -     clotrimazole-betamethasone (LOTRISONE) 1-0 05 % cream; Apply topically 2 (two) times a day    Monilial vulvovaginitis  -     fluconazole (DIFLUCAN) 150 mg tablet; Take 1 tablet (150 mg total) by mouth once for 1 dose Take day 1 and 4        Subjective:      Patient ID: Brooke Hay is a 82 y o  female  Patient presents with vulvovaginal irritation, itching burning with discharge for 6 weeks since she used an antibiotic for dental work  She has not used any otc treatment and only uses estrace cream once per week  She has used lotrisone in the past, but not recently  No other gyn concerns  The following portions of the patient's history were reviewed and updated as appropriate: allergies, current medications, past family history, past medical history, past social history, past surgical history and problem list     Review of Systems   Constitutional: Negative  HENT: Negative  Respiratory: Negative  Cardiovascular: Negative  Gastrointestinal: Negative  Endocrine: Negative  Genitourinary: Positive for vaginal discharge and vaginal pain  Negative for difficulty urinating, dysuria, frequency, pelvic pain, urgency and vaginal bleeding  Musculoskeletal: Negative  Skin: Negative  Neurological: Negative  Psychiatric/Behavioral: Negative  Objective: There were no vitals taken for this visit  Physical Exam  Vitals and nursing note reviewed  Exam conducted with a chaperone present  Constitutional:       Appearance: Normal appearance  She is normal weight  HENT:      Head: Normocephalic and atraumatic     Pulmonary:      Effort: Pulmonary effort is normal    Abdominal:      Hernia: There is no hernia in the left inguinal area or right inguinal area  Genitourinary:     Exam position: Supine  Pubic Area: Rash (moderate erythema throughout entire vulva extending perianally) present  Labia:         Right: Tenderness present  Left: Tenderness present  Urethra: No urethral pain, urethral swelling or urethral lesion  Vagina: No signs of injury and foreign body  Vaginal discharge (copious amounts of thick white curd-like discharge), erythema and tenderness present  No bleeding or lesions  Musculoskeletal:         General: Normal range of motion  Cervical back: Normal range of motion  Lymphadenopathy:      Lower Body: No right inguinal adenopathy  No left inguinal adenopathy  Skin:     General: Skin is warm and dry  Neurological:      Mental Status: She is alert and oriented to person, place, and time  Psychiatric:         Mood and Affect: Mood normal          Behavior: Behavior normal          Thought Content:  Thought content normal          Judgment: Judgment normal

## 2021-10-20 ENCOUNTER — DOCUMENTATION (OUTPATIENT)
Dept: GYNECOLOGY | Facility: CLINIC | Age: 86
End: 2021-10-20

## 2021-10-20 ENCOUNTER — TELEPHONE (OUTPATIENT)
Dept: GYNECOLOGY | Facility: CLINIC | Age: 86
End: 2021-10-20

## 2021-10-25 ENCOUNTER — OFFICE VISIT (OUTPATIENT)
Dept: GYNECOLOGY | Facility: CLINIC | Age: 86
End: 2021-10-25
Payer: MEDICARE

## 2021-10-25 VITALS
WEIGHT: 121 LBS | HEIGHT: 65 IN | HEART RATE: 96 BPM | DIASTOLIC BLOOD PRESSURE: 70 MMHG | SYSTOLIC BLOOD PRESSURE: 118 MMHG | BODY MASS INDEX: 20.16 KG/M2

## 2021-10-25 DIAGNOSIS — N39.0 URINARY TRACT INFECTION WITHOUT HEMATURIA, SITE UNSPECIFIED: ICD-10-CM

## 2021-10-25 DIAGNOSIS — N89.8 VAGINAL IRRITATION: ICD-10-CM

## 2021-10-25 DIAGNOSIS — N95.2 VAGINAL ATROPHY: Primary | ICD-10-CM

## 2021-10-25 DIAGNOSIS — L90.0 LICHEN SCLEROSUS: ICD-10-CM

## 2021-10-25 DIAGNOSIS — N89.8 VAGINAL IRRITATION: Primary | ICD-10-CM

## 2021-10-25 LAB

## 2021-10-25 PROCEDURE — 81001 URINALYSIS AUTO W/SCOPE: CPT | Performed by: OBSTETRICS & GYNECOLOGY

## 2021-10-25 PROCEDURE — 99213 OFFICE O/P EST LOW 20 MIN: CPT | Performed by: OBSTETRICS & GYNECOLOGY

## 2021-10-25 PROCEDURE — 87086 URINE CULTURE/COLONY COUNT: CPT | Performed by: OBSTETRICS & GYNECOLOGY

## 2021-10-26 LAB — BACTERIA UR CULT: NORMAL

## 2021-10-28 ENCOUNTER — DOCUMENTATION (OUTPATIENT)
Dept: GYNECOLOGY | Facility: CLINIC | Age: 86
End: 2021-10-28

## 2021-10-29 DIAGNOSIS — L90.0 LICHEN SCLEROSUS: ICD-10-CM

## 2021-10-29 LAB
A VAGINAE DNA VAG NAA+PROBE-LOG#: NOT DETECTED LOG CELLS/ML
C GLABRATA DNA VAG QL NAA+PROBE: NOT DETECTED
CANDIDA DNA VAG QL NAA+PROBE: NOT DETECTED
G VAGINALIS DNA VAG NAA+PROBE-LOG#: NOT DETECTED LOG CELLS/ML
LACTOBACILLUS DNA VAG NAA+PROBE-LOG#: 7.5 LOG (CELLS/ML)
MEGASPHAERA SP DNA VAG NAA+PROBE-LOG#: NOT DETECTED LOG CELLS/ML
SL AMB BV CATEGORY:: NORMAL
SL AMB C. PARAPSILOSIS, DNA: NOT DETECTED
SL AMB C. TROPICALIS, DNA: NOT DETECTED
T VAGINALIS RRNA SPEC QL NAA+PROBE: NOT DETECTED

## 2021-10-29 RX ORDER — CLOBETASOL PROPIONATE 0.5 MG/G
CREAM TOPICAL 2 TIMES DAILY
Qty: 60 G | Refills: 1 | Status: SHIPPED | OUTPATIENT
Start: 2021-10-29 | End: 2022-06-28 | Stop reason: SDUPTHER

## 2022-01-03 PROBLEM — D13.1 ADENOMATOUS POLYP OF STOMACH: Status: ACTIVE | Noted: 2022-01-03

## 2022-01-03 PROBLEM — E11.9 DIABETES MELLITUS (HCC): Status: ACTIVE | Noted: 2022-01-03

## 2022-01-27 ENCOUNTER — TELEPHONE (OUTPATIENT)
Dept: GASTROENTEROLOGY | Facility: HOSPITAL | Age: 87
End: 2022-01-27

## 2022-01-28 ENCOUNTER — ANESTHESIA (OUTPATIENT)
Dept: GASTROENTEROLOGY | Facility: HOSPITAL | Age: 87
End: 2022-01-28

## 2022-01-28 ENCOUNTER — ANESTHESIA EVENT (OUTPATIENT)
Dept: GASTROENTEROLOGY | Facility: HOSPITAL | Age: 87
End: 2022-01-28

## 2022-01-28 ENCOUNTER — HOSPITAL ENCOUNTER (OUTPATIENT)
Dept: GASTROENTEROLOGY | Facility: HOSPITAL | Age: 87
Setting detail: OUTPATIENT SURGERY
Discharge: HOME | End: 2022-01-28
Attending: INTERNAL MEDICINE | Admitting: INTERNAL MEDICINE
Payer: MEDICARE

## 2022-01-28 VITALS
BODY MASS INDEX: 19.83 KG/M2 | DIASTOLIC BLOOD PRESSURE: 54 MMHG | HEART RATE: 63 BPM | TEMPERATURE: 98 F | SYSTOLIC BLOOD PRESSURE: 101 MMHG | OXYGEN SATURATION: 97 % | HEIGHT: 65 IN | WEIGHT: 119 LBS | RESPIRATION RATE: 20 BRPM

## 2022-01-28 DIAGNOSIS — D13.1 ADENOMATOUS POLYP OF STOMACH: ICD-10-CM

## 2022-01-28 DIAGNOSIS — B37.81 ESOPHAGEAL CANDIDIASIS (HCC): Primary | ICD-10-CM

## 2022-01-28 PROCEDURE — 88360 TUMOR IMMUNOHISTOCHEM/MANUAL: CPT | Performed by: PATHOLOGY

## 2022-01-28 PROCEDURE — 88341 IMHCHEM/IMCYTCHM EA ADD ANTB: CPT | Performed by: PATHOLOGY

## 2022-01-28 PROCEDURE — 43251 EGD REMOVE LESION SNARE: CPT | Performed by: INTERNAL MEDICINE

## 2022-01-28 PROCEDURE — 88305 TISSUE EXAM BY PATHOLOGIST: CPT | Performed by: PATHOLOGY

## 2022-01-28 PROCEDURE — 88342 IMHCHEM/IMCYTCHM 1ST ANTB: CPT | Performed by: PATHOLOGY

## 2022-01-28 RX ORDER — POTASSIUM CHLORIDE 750 MG/1
10 TABLET, EXTENDED RELEASE ORAL DAILY
COMMUNITY

## 2022-01-28 RX ORDER — PROPOFOL 10 MG/ML
INJECTION, EMULSION INTRAVENOUS AS NEEDED
Status: DISCONTINUED | OUTPATIENT
Start: 2022-01-28 | End: 2022-01-28

## 2022-01-28 RX ORDER — LIDOCAINE HYDROCHLORIDE 20 MG/ML
INJECTION, SOLUTION EPIDURAL; INFILTRATION; INTRACAUDAL; PERINEURAL AS NEEDED
Status: DISCONTINUED | OUTPATIENT
Start: 2022-01-28 | End: 2022-01-28

## 2022-01-28 RX ORDER — SODIUM CHLORIDE 9 MG/ML
125 INJECTION, SOLUTION INTRAVENOUS CONTINUOUS
Status: DISCONTINUED | OUTPATIENT
Start: 2022-01-28 | End: 2022-02-01 | Stop reason: HOSPADM

## 2022-01-28 RX ADMIN — LIDOCAINE HYDROCHLORIDE 50 MG: 20 INJECTION, SOLUTION EPIDURAL; INFILTRATION; INTRACAUDAL; PERINEURAL at 09:09

## 2022-01-28 RX ADMIN — PROPOFOL 100 MG: 10 INJECTION, EMULSION INTRAVENOUS at 09:09

## 2022-01-28 RX ADMIN — SODIUM CHLORIDE: 0.9 INJECTION, SOLUTION INTRAVENOUS at 09:04

## 2022-01-28 RX ADMIN — PROPOFOL 50 MG: 10 INJECTION, EMULSION INTRAVENOUS at 09:13

## 2022-01-28 RX ADMIN — PROPOFOL 50 MG: 10 INJECTION, EMULSION INTRAVENOUS at 09:23

## 2022-01-28 RX ADMIN — PROPOFOL 50 MG: 10 INJECTION, EMULSION INTRAVENOUS at 09:19

## 2022-01-28 NOTE — DISCHARGE INSTRUCTIONS
Upper Endoscopy   WHAT YOU NEED TO KNOW:   An upper endoscopy is also called an upper gastrointestinal (GI) endoscopy, or an esophagogastroduodenoscopy (EGD)  You may feel bloated, gassy, or have some abdominal discomfort after your procedure  Your throat may be sore for 24 to 36 hours  You may burp or pass gas from air that is still inside your body  DISCHARGE INSTRUCTIONS:   Call 911 if:   · You have sudden chest pain or trouble breathing  Seek care immediately if:   · You feel dizzy or faint  · You have trouble swallowing  · You have severe throat pain  · Your bowel movements are very dark or black  · Your abdomen is hard and firm and you have severe pain  · You vomit blood  Contact your healthcare provider if:   · You feel full or bloated and cannot burp or pass gas  · You have not had a bowel movement for 3 days after your procedure  · You have neck pain  · You have a fever or chills  · You have nausea or are vomiting  · You have a rash or hives  · You have questions or concerns about your endoscopy  Relieve a sore throat:  Suck on throat lozenges or crushed ice  Gargle with a small amount of warm salt water  Mix 1 teaspoon of salt and 1 cup of warm water to make salt water  Relieve gas and discomfort from bloating:  Lie on your right side with a heating pad on your abdomen  Take short walks to help pass gas  Eat small meals until bloating is relieved  Rest after your procedure:  Do not drive or make important decisions until the day after your procedure  Return to your normal activity as directed  You can usually return to work the day after your procedure  Follow up with your healthcare provider as directed:  Write down your questions so you remember to ask them during your visits  © Copyright [employer] 2021 Information is for End User's use only and may not be sold, redistributed or otherwise used for commercial purposes   All illustrations and images included in CareNotes® are the copyrighted property of A D [employer] M [employer]  or Gregorio Hidalgo   The above information is an  only  It is not intended as medical advice for individual conditions or treatments  Talk to your doctor, nurse or pharmacist before following any medical regimen to see if it is safe and effective for you

## 2022-01-28 NOTE — H&P
H&P EXAM - Outpatient Endoscopy   Preet Found 82 y o  female MRN: [id number]    [facility] GI LAB [facility]   Encounter: [id number]        Impression: gastric adenoma    Plan:egd    Chief Complaint: recent egd showing adenoma in stomach; needs to be removed    Physical Exam: no distress   Chest: cta   Heart: rrr

## 2022-01-28 NOTE — ANESTHESIA PREPROCEDURE EVALUATION
Procedure:  EGD    Relevant Problems   ANESTHESIA (within normal limits)      CARDIO   (+) Diabetes mellitus with peripheral circulatory disorder (HCC)      GI/HEPATIC   (+) GERD (gastroesophageal reflux disease)      Other   (+) Allergic   (+) Thyroid disorder        Physical Exam    Airway    Mallampati score: I  TM Distance: >3 FB  Neck ROM: full     Dental       Cardiovascular  Rhythm: regular, Rate: normal,     Pulmonary  Breath sounds clear to auscultation,     Other Findings        Anesthesia Plan  ASA Score- 3     Anesthesia Type- IV sedation with anesthesia with ASA Monitors  Additional Monitors:   Airway Plan:           Plan Factors-Exercise tolerance (METS): >4 METS  Chart reviewed  Patient summary reviewed  Patient is not a current smoker  Induction- intravenous  Postoperative Plan-     Informed Consent- Anesthetic plan and risks discussed with patient

## 2022-02-23 PROBLEM — C16.1 MALIGNANT NEOPLASM OF FUNDUS OF STOMACH (HCC): Status: ACTIVE | Noted: 2022-02-23

## 2022-03-01 PROCEDURE — 88342 IMHCHEM/IMCYTCHM 1ST ANTB: CPT | Performed by: PATHOLOGY

## 2022-03-01 PROCEDURE — 88305 TISSUE EXAM BY PATHOLOGIST: CPT | Performed by: PATHOLOGY

## 2022-03-02 ENCOUNTER — LAB REQUISITION (OUTPATIENT)
Dept: LAB | Facility: HOSPITAL | Age: 87
End: 2022-03-02
Payer: MEDICARE

## 2022-03-02 DIAGNOSIS — Z85.00 PERSONAL HISTORY OF MALIGNANT NEOPLASM OF UNSPECIFIED DIGESTIVE ORGAN: ICD-10-CM

## 2022-03-02 DIAGNOSIS — K31.7 POLYP OF STOMACH AND DUODENUM: ICD-10-CM

## 2022-03-29 ENCOUNTER — DOCUMENTATION (OUTPATIENT)
Dept: GYNECOLOGY | Facility: CLINIC | Age: 87
End: 2022-03-29

## 2022-03-29 DIAGNOSIS — B37.3 MONILIAL VULVOVAGINITIS: Primary | ICD-10-CM

## 2022-03-29 NOTE — PROGRESS NOTES
Called pt she needs to call the office I need to find out what kind of symptoms she is having  To determine treatment for a infection

## 2022-03-30 RX ORDER — FLUCONAZOLE 150 MG/1
150 TABLET ORAL
Qty: 2 TABLET | Refills: 0 | OUTPATIENT
Start: 2022-03-30 | End: 2022-04-03

## 2022-05-02 ENCOUNTER — TELEPHONE (OUTPATIENT)
Dept: GYNECOLOGY | Facility: CLINIC | Age: 87
End: 2022-05-02

## 2022-05-02 DIAGNOSIS — B37.3 MONILIAL VULVOVAGINITIS: Primary | ICD-10-CM

## 2022-05-02 DIAGNOSIS — B37.3 CANDIDIASIS OF VULVA AND VAGINA: ICD-10-CM

## 2022-05-02 RX ORDER — FLUCONAZOLE 150 MG/1
150 TABLET ORAL ONCE
Qty: 1 TABLET | Refills: 0 | Status: CANCELLED | OUTPATIENT
Start: 2022-05-02 | End: 2022-05-02

## 2022-05-03 RX ORDER — CLOTRIMAZOLE AND BETAMETHASONE DIPROPIONATE 10; .64 MG/G; MG/G
CREAM TOPICAL 2 TIMES DAILY
Qty: 30 G | Refills: 0 | Status: SHIPPED | OUTPATIENT
Start: 2022-05-03

## 2022-06-28 DIAGNOSIS — L90.0 LICHEN SCLEROSUS: ICD-10-CM

## 2022-06-28 DIAGNOSIS — N95.2 ATROPHIC VAGINITIS: ICD-10-CM

## 2022-06-28 RX ORDER — CLOBETASOL PROPIONATE 0.5 MG/G
CREAM TOPICAL 2 TIMES DAILY
Qty: 60 G | Refills: 1 | Status: SHIPPED | OUTPATIENT
Start: 2022-06-28

## 2022-06-28 RX ORDER — ESTRADIOL 0.1 MG/G
1 CREAM VAGINAL WEEKLY
Qty: 42.5 G | Refills: 3 | Status: SHIPPED | OUTPATIENT
Start: 2022-06-28

## 2022-07-05 ENCOUNTER — HOSPITAL ENCOUNTER (OUTPATIENT)
Dept: MAMMOGRAPHY | Facility: MEDICAL CENTER | Age: 87
Discharge: HOME | End: 2022-07-05
Payer: MEDICARE

## 2022-07-05 VITALS — BODY MASS INDEX: 18.33 KG/M2 | HEIGHT: 65 IN | WEIGHT: 110 LBS

## 2022-07-05 DIAGNOSIS — Z12.31 ENCOUNTER FOR SCREENING MAMMOGRAM FOR MALIGNANT NEOPLASM OF BREAST: ICD-10-CM

## 2022-07-05 PROCEDURE — 77067 SCR MAMMO BI INCL CAD: CPT

## 2022-07-05 PROCEDURE — 77063 BREAST TOMOSYNTHESIS BI: CPT

## 2022-08-01 ENCOUNTER — DOCUMENTATION (OUTPATIENT)
Dept: GYNECOLOGY | Facility: CLINIC | Age: 87
End: 2022-08-01

## 2022-08-01 NOTE — PROGRESS NOTES
Pt called she is c/o burnig with urination no urgency,  no frequency  No vaginal d/c just feels sore and irritated  Does not think it is UTI more like a yeast infection   Did have antibiotics 3 weeks ago  Has been trying to deal with this herself but nothing is helping the soreness and discomfort   Please advise  Val [facility] in [place]

## 2022-08-02 DIAGNOSIS — B37.31 MONILIAL VULVOVAGINITIS: Primary | ICD-10-CM

## 2022-08-12 ENCOUNTER — OFFICE VISIT (OUTPATIENT)
Dept: GYNECOLOGY | Facility: CLINIC | Age: 87
End: 2022-08-12
Payer: MEDICARE

## 2022-08-12 VITALS
DIASTOLIC BLOOD PRESSURE: 68 MMHG | HEIGHT: 65 IN | HEART RATE: 84 BPM | SYSTOLIC BLOOD PRESSURE: 100 MMHG | WEIGHT: 116 LBS | BODY MASS INDEX: 19.33 KG/M2

## 2022-08-12 DIAGNOSIS — N95.2 ATROPHIC VAGINITIS: Primary | ICD-10-CM

## 2022-08-12 DIAGNOSIS — N76.0 VAGINITIS AND VULVOVAGINITIS: ICD-10-CM

## 2022-08-12 PROCEDURE — 99212 OFFICE O/P EST SF 10 MIN: CPT | Performed by: OBSTETRICS & GYNECOLOGY

## 2022-08-12 NOTE — PROGRESS NOTES
Patient presents to the office complaining of vaginal soreness and burning  She had called into the office 10 days ago thinking she had a yeast infection  She was placed on Terazol 7 and has seen no improvement  She denies any vaginal bleeding  She does have a scant discharge  She is status post TAHBSO  She has been using Estrace cream weekly  Was recently on antibiotics  Physical exam abdomen is soft with no masses tenderness or adenopathy  On pelvic exam uterus and cervix were surgically absent  Vagina with atrophic changes  There is a scant discharge present  Sure swab culture was obtained  Urethral orifice normal   Bartholin's and Sloan's glands normal   No cystocele or rectocele        Impression:  Atrophic vaginitis     Plan: Increase Estrace cream to 1 g twice weekly for 1 month and then back down to 1 g weekly

## 2022-08-13 LAB
BV BACTERIA RRNA VAG QL NAA+PROBE: NEGATIVE
C GLABRATA RNA VAG QL NAA+PROBE: NOT DETECTED
CANDIDA RRNA VAG QL PROBE: NOT DETECTED
T VAGINALIS RRNA SPEC QL NAA+PROBE: NOT DETECTED

## 2023-02-07 ENCOUNTER — DOCUMENTATION (OUTPATIENT)
Dept: GYNECOLOGY | Facility: CLINIC | Age: 88
End: 2023-02-07

## 2023-02-07 DIAGNOSIS — N76.0 VAGINITIS AND VULVOVAGINITIS: Primary | ICD-10-CM

## 2023-02-07 RX ORDER — FLUCONAZOLE 150 MG/1
TABLET ORAL
Qty: 1 TABLET | Refills: 0 | Status: SHIPPED | OUTPATIENT
Start: 2023-02-07 | End: 2023-02-09

## 2023-02-07 NOTE — PROGRESS NOTES
Called has been on several antibiotics the last 3 weeks and now has vaginal itching,   Yellowish d/c not much odor,  Would like something called in to [facility] in [place]      Please advise     Pt want a call please send back to the clinical pool when done

## 2023-06-14 ENCOUNTER — TELEPHONE (OUTPATIENT)
Dept: GYNECOLOGY | Facility: CLINIC | Age: 88
End: 2023-06-14

## 2023-06-14 DIAGNOSIS — B37.9 YEAST INFECTION: Primary | ICD-10-CM

## 2023-06-14 RX ORDER — FLUCONAZOLE 150 MG/1
150 TABLET ORAL ONCE
Qty: 2 TABLET | Refills: 0 | Status: SHIPPED | OUTPATIENT
Start: 2023-06-14 | End: 2023-06-14

## 2023-06-14 NOTE — TELEPHONE ENCOUNTER
Patient called and states she has yeast infection from being on antibiotic  Symptoms are discharge and very sore  Can meds be called to [facility] in [place]? Please advise

## 2023-06-23 DIAGNOSIS — Z12.31 ENCOUNTER FOR SCREENING MAMMOGRAM FOR MALIGNANT NEOPLASM OF BREAST: Primary | ICD-10-CM

## 2023-07-03 PROBLEM — R10.13 EPIGASTRIC PAIN: Status: ACTIVE | Noted: 2023-07-03

## 2023-07-07 ENCOUNTER — HOSPITAL ENCOUNTER (OUTPATIENT)
Dept: MAMMOGRAPHY | Facility: MEDICAL CENTER | Age: 88
Discharge: HOME | End: 2023-07-07
Payer: MEDICARE

## 2023-07-07 VITALS — BODY MASS INDEX: 18.83 KG/M2 | WEIGHT: 113 LBS | HEIGHT: 65 IN

## 2023-07-07 DIAGNOSIS — Z12.31 ENCOUNTER FOR SCREENING MAMMOGRAM FOR MALIGNANT NEOPLASM OF BREAST: ICD-10-CM

## 2023-07-07 PROCEDURE — 77063 BREAST TOMOSYNTHESIS BI: CPT

## 2023-07-07 PROCEDURE — 77067 SCR MAMMO BI INCL CAD: CPT

## 2023-07-25 ENCOUNTER — TELEPHONE (OUTPATIENT)
Dept: GYNECOLOGY | Facility: CLINIC | Age: 88
End: 2023-07-25

## 2023-08-02 ENCOUNTER — OFFICE VISIT (OUTPATIENT)
Dept: GYNECOLOGY | Facility: CLINIC | Age: 88
End: 2023-08-02
Payer: MEDICARE

## 2023-08-02 VITALS
DIASTOLIC BLOOD PRESSURE: 78 MMHG | HEIGHT: 65 IN | BODY MASS INDEX: 18.83 KG/M2 | WEIGHT: 113 LBS | HEART RATE: 72 BPM | SYSTOLIC BLOOD PRESSURE: 124 MMHG

## 2023-08-02 DIAGNOSIS — B37.9 YEAST INFECTION: Primary | ICD-10-CM

## 2023-08-02 DIAGNOSIS — E11.51 DIABETES MELLITUS WITH PERIPHERAL CIRCULATORY DISORDER (HCC): ICD-10-CM

## 2023-08-02 DIAGNOSIS — M06.4 UNDIFFERENTIATED INFLAMMATORY POLYARTHRITIS (HCC): ICD-10-CM

## 2023-08-02 DIAGNOSIS — N76.0 VAGINITIS AND VULVOVAGINITIS: ICD-10-CM

## 2023-08-02 DIAGNOSIS — B37.31 CANDIDIASIS OF VULVA AND VAGINA: ICD-10-CM

## 2023-08-02 DIAGNOSIS — B37.31 MONILIAL VULVOVAGINITIS: ICD-10-CM

## 2023-08-02 DIAGNOSIS — N89.8 VAGINAL IRRITATION: ICD-10-CM

## 2023-08-02 PROCEDURE — 99213 OFFICE O/P EST LOW 20 MIN: CPT | Performed by: OBSTETRICS & GYNECOLOGY

## 2023-08-02 RX ORDER — CLOTRIMAZOLE AND BETAMETHASONE DIPROPIONATE 10; .64 MG/G; MG/G
CREAM TOPICAL 2 TIMES DAILY
Qty: 30 G | Refills: 0 | Status: SHIPPED | OUTPATIENT
Start: 2023-08-02

## 2023-08-02 NOTE — PROGRESS NOTES
Assessment/Plan:    No problem-specific Assessment & Plan notes found for this encounter. Diagnoses and all orders for this visit:    Yeast infection  -     Sureswab(R), Bacterial Vaginosis/Vaginitis  -     Discontinue: terconazole (TERAZOL 7) 0.4 % vaginal cream; Insert 1 applicator into the vagina daily at bedtime  -     terconazole (TERAZOL 7) 0.4 % vaginal cream; Insert 1 applicator into the vagina daily at bedtime    Vaginal irritation  -     Sureswab(R), Bacterial Vaginosis/Vaginitis    Vaginitis and vulvovaginitis  -     Sureswab(R), Bacterial Vaginosis/Vaginitis                  Subjective:      Patient ID: Sher Demarco is a 82 y.o. female. HPI patient presents the office complaining of vaginal and vulvar irritation. She is status post POLLO/BSO. She called in mid June complaining of irritation and itching. She had prior to that been on antibiotics. She was placed on Diflucan tablets. She had temporary relief. She presents today complaining of continued vaginal irritation and burning. There is a slight discharge. Denies any fever, dysuria or hematuria. She continues use Estrace cream as directed . She continues to have urgency and urgency incontinence and also has episodes where she cannot completely empty her bladder. She has seen Dr. Robbie Hayes in the past.  She has had prior Botox injections. She has been referred back to Dr. Robbie Hayes for further evaluation. The following portions of the patient's history were reviewed and updated as appropriate:   She  has a past medical history of Allergic, Asthma, Chronic interstitial cystitis, Disease of thyroid gland, GERD (gastroesophageal reflux disease), Lichen sclerosus et atrophicus, and Osteoporosis.   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   • Epigastric pain 07/03/2023   • Malignant neoplasm of fundus of stomach ([address]) 02/23/2022   • GERD (gastroesophageal reflux disease)    • Allergic    • Diabetes mellitus ([facility]) 01/03/2022   • Adenomatous polyp of stomach 01/03/2022   • Diabetes mellitus with peripheral circulatory disorder ([facility]) 09/17/2021   • Thyroid disorder 05/09/2012     She  has a past surgical history that includes Appendectomy; Bladder surgery; Cataract extraction; Colonoscopy (1999); Replacement total knee (Left, 2008); Total abdominal hysterectomy (1972); Incisional breast biopsy (Bilateral); Breast biopsy (Bilateral); Colonoscopy (2006); Colonoscopy (2010); Colonoscopy (2013); Colonoscopy (12/15/2017); Replacement total knee (Right, 2010); Total shoulder replacement (Right, 2012); Upper gastrointestinal endoscopy (10/14/2021); TONSILECTOMY AND ADNOIDECTOMY; Partial gastrectomy (04/2022); EGD (05/23/2023); EGD (03/01/2022); and EGD (01/28/2022). Her family history includes Breast cancer in her cousin and mother; Heart attack in her mother; Heart disease in her father; Lung cancer in her father; No Known Problems in her daughter, maternal aunt, maternal grandfather, maternal grandmother, paternal aunt, paternal aunt, paternal grandfather, paternal grandmother, and sister. She  reports that she has never smoked. She has never used smokeless tobacco. She reports that she does not drink alcohol and does not use drugs.   Current Outpatient Medications   Medication Sig Dispense Refill   • clotrimazole-betamethasone (LOTRISONE) 1-0.05 % cream Apply topically 2 (two) times a day 30 g 0   • Cyanocobalamin (VITAMIN B-12) 500 MCG SUBL Place under the tongue     • estradiol (ESTRACE) 0.1 mg/g vaginal cream Insert 1 g into the vagina once a week 42.5 g 3   • levothyroxine 75 mcg tablet Take 1 tablet by mouth daily     • Melatonin 3 MG CAPS Take 10 mg by mouth daily     • montelukast (SINGULAIR) 10 mg tablet      • Multiple Vitamin (multivitamin) tablet Take 1 tablet by mouth daily     • naproxen sodium (Aleve) 220 MG tablet Take 440 mg by mouth daily     • polyethylene glycol (MIRALAX) 17 g packet Take 17 g by mouth daily     • terconazole (TERAZOL 7) 0.4 % vaginal cream Insert 1 applicator into the vagina daily at bedtime 45 g 0   • Aspirin 81 MG EC tablet Take by mouth (Patient not taking: Reported on 7/3/2023)     • Azelastine HCl 0.15 % SOLN  (Patient not taking: Reported on 4/11/2023)     • Calcium-Magnesium 300-300 MG TABS Take by mouth (Patient not taking: Reported on 8/2/2023)     • Cetirizine HCl (ZYRTEC PO) Take by mouth (Patient not taking: Reported on 8/2/2023)     • clobetasol (TEMOVATE) 0.05 % cream Apply topically 2 (two) times a day Use in thin layer once weekly (Patient not taking: Reported on 8/23/2022) 60 g 1   • clotrimazole-betamethasone (LOTRISONE) 1-0.05 % cream Apply topically 2 (two) times a day (Patient not taking: Reported on 9/21/2021) 30 g 0   • famotidine (PEPCID) 20 mg tablet Take 1 tablet (20 mg total) by mouth 2 (two) times a day (Patient not taking: Reported on 8/2/2023) 28 tablet 2   • gabapentin (NEURONTIN) 100 mg capsule Take 100 mg by mouth 3 (three) times a day   (Patient not taking: Reported on 8/23/2022)     • Gabapentin 10 % CREA Apply topically (Patient not taking: Reported on 9/21/2021)     • Lactobacillus (EQL DIGESTIVE PROBIOTIC PO) Take by mouth (Patient not taking: Reported on 1/3/2022)     • levothyroxine 50 mcg tablet  (Patient not taking: Reported on 8/2/2023)     • Methylcellulose, Laxative, (CITRUCEL PO) Take by mouth (Patient not taking: Reported on 9/21/2021)     • metoclopramide (REGLAN) 5 mg/5 mL oral solution Take 5 mg by mouth 2 (two) times a day with meals (Patient not taking: Reported on 8/2/2023)     • NON FORMULARY Take 10 g by mouth in the morning Fiber Well (Patient not taking: Reported on 8/2/2023)     • pantoprazole (PROTONIX) 40 mg tablet Take 1 tablet (40 mg total) by mouth daily (Patient not taking: Reported on 8/23/2022) 30 tablet 2   • senna (SENOKOT) 8.6 MG tablet Take by mouth   (Patient not taking: Reported on 8/2/2023)     • sucralfate (CARAFATE) 1 g/10 mL suspension Take 10 mL (1 g total) by mouth 4 (four) times a day (Patient not taking: Reported on 8/2/2023) 414 mL 1     No current facility-administered medications for this visit.      Current Outpatient Medications on File Prior to Visit   Medication Sig   • Cyanocobalamin (VITAMIN B-12) 500 MCG SUBL Place under the tongue   • estradiol (ESTRACE) 0.1 mg/g vaginal cream Insert 1 g into the vagina once a week   • levothyroxine 75 mcg tablet Take 1 tablet by mouth daily   • Melatonin 3 MG CAPS Take 10 mg by mouth daily   • montelukast (SINGULAIR) 10 mg tablet    • Multiple Vitamin (multivitamin) tablet Take 1 tablet by mouth daily   • naproxen sodium (Aleve) 220 MG tablet Take 440 mg by mouth daily   • polyethylene glycol (MIRALAX) 17 g packet Take 17 g by mouth daily   • Aspirin 81 MG EC tablet Take by mouth (Patient not taking: Reported on 7/3/2023)   • Azelastine HCl 0.15 % SOLN  (Patient not taking: Reported on 4/11/2023)   • Calcium-Magnesium 300-300 MG TABS Take by mouth (Patient not taking: Reported on 8/2/2023)   • Cetirizine HCl (ZYRTEC PO) Take by mouth (Patient not taking: Reported on 8/2/2023)   • clobetasol (TEMOVATE) 0.05 % cream Apply topically 2 (two) times a day Use in thin layer once weekly (Patient not taking: Reported on 8/23/2022)   • clotrimazole-betamethasone (LOTRISONE) 1-0.05 % cream Apply topically 2 (two) times a day (Patient not taking: Reported on 9/21/2021)   • famotidine (PEPCID) 20 mg tablet Take 1 tablet (20 mg total) by mouth 2 (two) times a day (Patient not taking: Reported on 8/2/2023)   • gabapentin (NEURONTIN) 100 mg capsule Take 100 mg by mouth 3 (three) times a day   (Patient not taking: Reported on 8/23/2022)   • Gabapentin 10 % CREA Apply topically (Patient not taking: Reported on 9/21/2021)   • Lactobacillus (EQL DIGESTIVE PROBIOTIC PO) Take by mouth (Patient not taking: Reported on 1/3/2022)   • levothyroxine 50 mcg tablet  (Patient not taking: Reported on 8/2/2023)   • Methylcellulose, Laxative, (CITRUCEL PO) Take by mouth (Patient not taking: Reported on 9/21/2021)   • metoclopramide (REGLAN) 5 mg/5 mL oral solution Take 5 mg by mouth 2 (two) times a day with meals (Patient not taking: Reported on 8/2/2023)   • NON FORMULARY Take 10 g by mouth in the morning Fiber Well (Patient not taking: Reported on 8/2/2023)   • pantoprazole (PROTONIX) 40 mg tablet Take 1 tablet (40 mg total) by mouth daily (Patient not taking: Reported on 8/23/2022)   • senna (SENOKOT) 8.6 MG tablet Take by mouth   (Patient not taking: Reported on 8/2/2023)   • sucralfate (CARAFATE) 1 g/10 mL suspension Take 10 mL (1 g total) by mouth 4 (four) times a day (Patient not taking: Reported on 8/2/2023)   • [DISCONTINUED] clotrimazole-betamethasone (LOTRISONE) 1-0.05 % cream Apply topically 2 (two) times a day (Patient not taking: Reported on 8/23/2022)     No current facility-administered medications on file prior to visit. She is allergic to cephalexin, codeine, meperidine, other, and penicillins. .    Review of Systems   Constitutional: Negative. Gastrointestinal: Negative. Genitourinary: Positive for difficulty urinating, urgency and vaginal discharge. Negative for dysuria, hematuria, pelvic pain and vaginal bleeding. Objective:      /78   Pulse 72   Ht 5' 5" (1.651 m)   Wt 51.3 kg (113 lb)   BMI 18.80 kg/m²          Physical Exam  Vitals reviewed. Abdominal:      General: There is no distension. Palpations: Abdomen is soft. There is no mass. Tenderness: There is no abdominal tenderness. There is no guarding or rebound. Hernia: No hernia is present. There is no hernia in the left inguinal area or right inguinal area. Genitourinary:     General: Normal vulva. Labia:         Right: Rash present. Left: Rash present. Comments: Uterus and cervix surgically absent.   Vagina with atrophic changes. Discharge present. Sure swab culture obtained. No palpable adnexal masses or tenderness. Urethral orifice normal.  No cystocele or rectocele noted. Bartholin's and Carlyss's glands normal.  Lymphadenopathy:      Lower Body: No right inguinal adenopathy. No left inguinal adenopathy. Neurological:      Mental Status: She is alert.

## 2023-08-07 ENCOUNTER — TELEPHONE (OUTPATIENT)
Dept: GYNECOLOGY | Facility: CLINIC | Age: 88
End: 2023-08-07

## 2023-08-07 NOTE — TELEPHONE ENCOUNTER
Pt started medication prescribed at last visit - Lortisone and terconazole. Pt states that she is having vaginal burning when using the medication and would like to know if that should be happening. Please advise.

## 2023-08-08 DIAGNOSIS — N76.0 ACUTE VAGINITIS: Primary | ICD-10-CM

## 2023-08-09 RX ORDER — FLUCONAZOLE 150 MG/1
TABLET ORAL
Qty: 2 TABLET | Refills: 0 | Status: SHIPPED | OUTPATIENT
Start: 2023-08-09 | End: 2023-08-11

## 2023-10-27 DIAGNOSIS — B37.31 MONILIAL VULVOVAGINITIS: ICD-10-CM

## 2023-10-27 DIAGNOSIS — N95.2 ATROPHIC VAGINITIS: ICD-10-CM

## 2023-10-27 DIAGNOSIS — B37.31 CANDIDIASIS OF VULVA AND VAGINA: ICD-10-CM

## 2023-10-27 DIAGNOSIS — L90.0 LICHEN SCLEROSUS: ICD-10-CM

## 2023-10-27 RX ORDER — CLOTRIMAZOLE AND BETAMETHASONE DIPROPIONATE 10; .64 MG/G; MG/G
CREAM TOPICAL 2 TIMES DAILY
Qty: 30 G | Refills: 0 | Status: SHIPPED | OUTPATIENT
Start: 2023-10-27

## 2023-10-27 RX ORDER — ESTRADIOL 0.1 MG/G
1 CREAM VAGINAL WEEKLY
Qty: 42.5 G | Refills: 3 | Status: SHIPPED | OUTPATIENT
Start: 2023-10-27

## 2023-12-15 RX ORDER — DULOXETIN HYDROCHLORIDE 20 MG/1
20 CAPSULE, DELAYED RELEASE ORAL DAILY
COMMUNITY
Start: 2023-10-04

## 2023-12-15 NOTE — PRE-PROCEDURE INSTRUCTIONS
Pre-Surgery Instructions:   Medication Instructions    Calcium-Vitamin D-Vitamin K (VIACTIV PO) Stop taking 7 days prior to surgery.    clobetasol (TEMOVATE) 0.05 % cream Hold day of surgery.    Cyanocobalamin (VITAMIN B-12) 500 MCG SUBL Stop taking 7 days prior to surgery.    DULoxetine (CYMBALTA) 20 mg capsule Take day of surgery.    estradiol (ESTRACE) 0.1 mg/g vaginal cream Hold day of surgery.    levothyroxine 75 mcg tablet Take day of surgery.    montelukast (SINGULAIR) 10 mg tablet Take day of surgery.    Multiple Vitamin (multivitamin) tablet Stop taking 7 days prior to surgery.    naproxen sodium (Aleve) 220 MG tablet Instructions provided by MD stopped 12/13    polyethylene glycol (MIRALAX) 17 g packet Hold day of surgery.    terconazole (TERAZOL 7) 0.4 % vaginal cream Hold day of surgery.   Medication instructions for day surgery reviewed. Please use only a sip of water to take your instructed medications. Avoid all over the counter vitamins, supplements and NSAIDS for one week prior to surgery per anesthesia guidelines. Tylenol is ok to take as needed.     You will receive a call one business day prior to surgery with an arrival time and hospital directions. If your surgery is scheduled on a Monday, the hospital will be calling you on the Friday prior to your surgery. If you have not heard from anyone by 8pm, please call the hospital supervisor through the hospital  at [phone]. ([place] [phone]).    Do not eat or drink anything after midnight the night before your surgery, including candy, mints, lifesavers, or chewing gum. Do not drink alcohol 24hrs before your surgery. Try not to smoke at least 24hrs before your surgery.       Follow the pre surgery showering instructions as listed in the “My Surgical Experience Booklet” or otherwise provided by your surgeon's office. Do not use a blade to shave the surgical area 1 week before surgery. It is okay to use a clean electric clippers up  to 24 hours before surgery. Do not apply any lotions, creams, including makeup, cologne, deodorant, or perfumes after showering on the day of your surgery. Do not use dry shampoo, hair spray, hair gel, or any type of hair products.     No contact lenses, eye make-up, or artificial eyelashes. Remove nail polish, including gel polish, and any artificial, gel, or acrylic nails if possible. Remove all jewelry including rings and body piercing jewelry.     Wear causal clothing that is easy to take on and off. Consider your type of surgery.    Keep any valuables, jewelry, piercings at home. Please bring any specially ordered equipment (sling, braces) if indicated.    Arrange for a responsible person to drive you to and from the hospital on the day of your surgery. Visitor Guidelines discussed.     Call the surgeon's office with any new illnesses, exposures, or additional questions prior to surgery.    Please reference your “My Surgical Experience Booklet” for additional information to prepare for your upcoming surgery.

## 2023-12-19 NOTE — DISCHARGE INSTRUCTIONS
DISCHARGE INSTRUCTIONS:     Contact your healthcare provider if:   You have pain at the insertion site not relieved with over-the-counter pain medication.  You have a fever.  Your incision is red, swollen, or has pus coming from it.  You have questions or concerns about your condition or care.     Medicines:  You may need any of the following:  Antibiotics  help to prevent a bacterial infection     Prescription pain medicine  may be given. Ask your healthcare provider how to take this medicine safely. Some prescription pain medicines contain acetaminophen. Do not take other medicines that contain acetaminophen without talking to your healthcare provider. Too much acetaminophen may cause liver damage. Prescription pain medicine may cause constipation. Ask your healthcare provider how to prevent or treat constipation.      Take your medicine as directed.  Contact your healthcare provider if you think your medicine is not helping or if you have side effects. Tell your provider if you are allergic to any medicine. Keep a list of the medicines, vitamins, and herbs you take. Include the amounts, and when and why you take them. Bring the list or the pill bottles to follow-up visits. Carry your medicine list with you in case of an emergency.     Self-care:      Limit activity such as stretching and lifting. Ask your healthcare provider which activities you should limit and for how long.      Follow up with your healthcare provider as directed:  Write down your questions so you remember to ask them at your visit.   © Copyright [employer] 2022 Information is for End User's use only and may not be sold, redistributed or otherwise used for commercial purposes.  The above information is an  only. It is not intended as medical advice for individual conditions or treatments. Talk to your doctor, nurse or pharmacist before following any medical regimen to see if it is safe and effective for you.

## 2023-12-25 ENCOUNTER — ANESTHESIA EVENT (OUTPATIENT)
Dept: PERIOP | Facility: HOSPITAL | Age: 88
End: 2023-12-25
Payer: MEDICARE

## 2023-12-26 NOTE — ANESTHESIA PREPROCEDURE EVALUATION
Procedure:  IMPLANTATION NEUROSTIMULATOR ELECTRODE ARRAY SACRAL NERVE; INSERTION  NEUROSTIMULATOR; FLUORO; ELECTRONIC ANALYSIS (Bladder)    Relevant Problems   ANESTHESIA (within normal limits)      CARDIO (within normal limits)  8/2023   2D Echo     Left Ventricle: Left ventricle is normal in size. Wall thickness is   normal. Systolic function is normal with an ejection fraction of 60-65%.   Wall motion is within normal limits. There is normal diastolic function.   ·  Right Ventricle: Right ventricle cavity is normal. Systolic function is   normal.   · Left Atrium: Left atrium cavity size is normal.   ·  Aortic Valve: The aortic valve is trileaflet. The leaflets are mildly   thickened. The leaflets are mildly calcified. There is mild regurgitation.   There is no evidence of aortic valve stenosis.   ·  Mitral Valve: The leaflets are mildly thickened. There is trace   regurgitation. There is no evidence of mitral valve stenosis.   ·  IVC/SVC: The inferior vena cava demonstrates a diameter of <=21 mm and   collapses >50%; therefore, the right atrial pressure is estimated at 0-5   mmHg.   · Pulmonic Valve: PA pressure not calculated due to incomplete TR signal.           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)      /RENAL (within normal limits)      GYN (within normal limits)      HEMATOLOGY (within normal limits)      MUSCULOSKELETAL   (+) Undifferentiated inflammatory polyarthritis (HCC)      NEURO/PSYCH (within normal limits)      PULMONARY (within normal limits)        Physical Exam    Airway    Mallampati score: II  TM Distance: >3 FB  Neck ROM: full     Dental   Comment: partials lower dentures and upper dentures    Cardiovascular  Rhythm: regular, Rate: normal, Cardiovascular exam normal    Pulmonary  Pulmonary exam normal Breath sounds clear to auscultation    Other Findings  post-pubertal.      Anesthesia  Plan  ASA Score- 2     Anesthesia Type- IV sedation with anesthesia with ASA Monitors.         Additional Monitors:     Airway Plan:            Plan Factors-Exercise tolerance (METS): >4 METS.    Chart reviewed. EKG reviewed.  Existing labs reviewed. Patient summary reviewed.    Patient is not a current smoker. Patient not instructed to abstain from smoking on day of procedure. Patient did not smoke on day of surgery.            Induction- intravenous.    Postoperative Plan- Plan for postoperative opioid use.     Informed Consent- Anesthetic plan and risks discussed with patient.

## 2023-12-27 ENCOUNTER — ANESTHESIA (OUTPATIENT)
Dept: PERIOP | Facility: HOSPITAL | Age: 88
End: 2023-12-27
Payer: MEDICARE

## 2023-12-27 ENCOUNTER — HOSPITAL ENCOUNTER (OUTPATIENT)
Dept: RADIOLOGY | Facility: HOSPITAL | Age: 88
Setting detail: OUTPATIENT SURGERY
Discharge: HOME | End: 2023-12-27
Payer: MEDICARE

## 2023-12-27 ENCOUNTER — HOSPITAL ENCOUNTER (OUTPATIENT)
Facility: HOSPITAL | Age: 88
Setting detail: OUTPATIENT SURGERY
Discharge: HOME | End: 2023-12-27
Attending: OBSTETRICS & GYNECOLOGY | Admitting: OBSTETRICS & GYNECOLOGY
Payer: MEDICARE

## 2023-12-27 VITALS
OXYGEN SATURATION: 98 % | BODY MASS INDEX: 17.32 KG/M2 | WEIGHT: 104.06 LBS | RESPIRATION RATE: 16 BRPM | DIASTOLIC BLOOD PRESSURE: 71 MMHG | SYSTOLIC BLOOD PRESSURE: 161 MMHG | HEART RATE: 70 BPM | TEMPERATURE: 97.4 F

## 2023-12-27 DIAGNOSIS — N39.41 URGE INCONTINENCE: ICD-10-CM

## 2023-12-27 PROCEDURE — C1778 LEAD, NEUROSTIMULATOR: HCPCS | Performed by: OBSTETRICS & GYNECOLOGY

## 2023-12-27 PROCEDURE — C1820 GENERATOR NEURO RECHG BAT SY: HCPCS | Performed by: OBSTETRICS & GYNECOLOGY

## 2023-12-27 PROCEDURE — C1787 PATIENT PROGR, NEUROSTIM: HCPCS | Performed by: OBSTETRICS & GYNECOLOGY

## 2023-12-27 PROCEDURE — 72220 X-RAY EXAM SACRUM TAILBONE: CPT

## 2023-12-27 DEVICE — NEUROSTIMULATOR
Type: IMPLANTABLE DEVICE | Site: BLADDER | Status: FUNCTIONAL
Brand: AXONICS

## 2023-12-27 DEVICE — TINED LEAD KIT
Type: IMPLANTABLE DEVICE | Site: BLADDER | Status: FUNCTIONAL
Brand: AXONICS

## 2023-12-27 RX ORDER — ACETAMINOPHEN 325 MG/1
975 TABLET ORAL EVERY 6 HOURS PRN
Status: DISCONTINUED | OUTPATIENT
Start: 2023-12-27 | End: 2023-12-27 | Stop reason: HOSPADM

## 2023-12-27 RX ORDER — LIDOCAINE HYDROCHLORIDE 20 MG/ML
INJECTION, SOLUTION EPIDURAL; INFILTRATION; INTRACAUDAL; PERINEURAL AS NEEDED
Status: DISCONTINUED | OUTPATIENT
Start: 2023-12-27 | End: 2023-12-27

## 2023-12-27 RX ORDER — BUPIVACAINE HYDROCHLORIDE 2.5 MG/ML
INJECTION, SOLUTION EPIDURAL; INFILTRATION; INTRACAUDAL AS NEEDED
Status: DISCONTINUED | OUTPATIENT
Start: 2023-12-27 | End: 2023-12-27 | Stop reason: HOSPADM

## 2023-12-27 RX ORDER — HYDROMORPHONE HCL/PF 1 MG/ML
0.5 SYRINGE (ML) INJECTION
Status: DISCONTINUED | OUTPATIENT
Start: 2023-12-27 | End: 2023-12-27 | Stop reason: HOSPADM

## 2023-12-27 RX ORDER — PROPOFOL 10 MG/ML
INJECTION, EMULSION INTRAVENOUS CONTINUOUS PRN
Status: DISCONTINUED | OUTPATIENT
Start: 2023-12-27 | End: 2023-12-27

## 2023-12-27 RX ORDER — FENTANYL CITRATE 50 UG/ML
INJECTION, SOLUTION INTRAMUSCULAR; INTRAVENOUS AS NEEDED
Status: DISCONTINUED | OUTPATIENT
Start: 2023-12-27 | End: 2023-12-27

## 2023-12-27 RX ORDER — ACETAMINOPHEN 325 MG/1
975 TABLET ORAL ONCE
Status: DISCONTINUED | OUTPATIENT
Start: 2023-12-27 | End: 2023-12-27 | Stop reason: HOSPADM

## 2023-12-27 RX ORDER — IBUPROFEN 600 MG/1
600 TABLET ORAL EVERY 6 HOURS PRN
Status: DISCONTINUED | OUTPATIENT
Start: 2023-12-27 | End: 2023-12-27 | Stop reason: HOSPADM

## 2023-12-27 RX ORDER — SODIUM CHLORIDE, SODIUM LACTATE, POTASSIUM CHLORIDE, CALCIUM CHLORIDE 600; 310; 30; 20 MG/100ML; MG/100ML; MG/100ML; MG/100ML
125 INJECTION, SOLUTION INTRAVENOUS CONTINUOUS
Status: DISCONTINUED | OUTPATIENT
Start: 2023-12-27 | End: 2023-12-27 | Stop reason: HOSPADM

## 2023-12-27 RX ORDER — CEFAZOLIN SODIUM 1 G/50ML
1000 SOLUTION INTRAVENOUS ONCE
Status: COMPLETED | OUTPATIENT
Start: 2023-12-27 | End: 2023-12-27

## 2023-12-27 RX ORDER — ONDANSETRON 2 MG/ML
INJECTION INTRAMUSCULAR; INTRAVENOUS AS NEEDED
Status: DISCONTINUED | OUTPATIENT
Start: 2023-12-27 | End: 2023-12-27

## 2023-12-27 RX ORDER — FENTANYL CITRATE/PF 50 MCG/ML
25 SYRINGE (ML) INJECTION
Status: DISCONTINUED | OUTPATIENT
Start: 2023-12-27 | End: 2023-12-27 | Stop reason: HOSPADM

## 2023-12-27 RX ORDER — MAGNESIUM HYDROXIDE 1200 MG/15ML
LIQUID ORAL AS NEEDED
Status: DISCONTINUED | OUTPATIENT
Start: 2023-12-27 | End: 2023-12-27 | Stop reason: HOSPADM

## 2023-12-27 RX ORDER — ONDANSETRON 2 MG/ML
4 INJECTION INTRAMUSCULAR; INTRAVENOUS EVERY 6 HOURS PRN
Status: DISCONTINUED | OUTPATIENT
Start: 2023-12-27 | End: 2023-12-27 | Stop reason: HOSPADM

## 2023-12-27 RX ORDER — OXYCODONE HYDROCHLORIDE AND ACETAMINOPHEN 5; 325 MG/1; MG/1
1 TABLET ORAL EVERY 4 HOURS PRN
Status: DISCONTINUED | OUTPATIENT
Start: 2023-12-27 | End: 2023-12-27 | Stop reason: HOSPADM

## 2023-12-27 RX ORDER — ONDANSETRON 2 MG/ML
4 INJECTION INTRAMUSCULAR; INTRAVENOUS ONCE AS NEEDED
Status: DISCONTINUED | OUTPATIENT
Start: 2023-12-27 | End: 2023-12-27 | Stop reason: HOSPADM

## 2023-12-27 RX ADMIN — PROPOFOL 80 MCG/KG/MIN: 10 INJECTION, EMULSION INTRAVENOUS at 07:41

## 2023-12-27 RX ADMIN — LIDOCAINE HYDROCHLORIDE 60 MG: 20 INJECTION, SOLUTION EPIDURAL; INFILTRATION; INTRACAUDAL at 07:41

## 2023-12-27 RX ADMIN — FENTANYL CITRATE 25 MCG: 50 INJECTION INTRAMUSCULAR; INTRAVENOUS at 07:41

## 2023-12-27 RX ADMIN — IBUPROFEN 600 MG: 600 TABLET, FILM COATED ORAL at 10:11

## 2023-12-27 RX ADMIN — FENTANYL CITRATE 25 MCG: 50 INJECTION INTRAMUSCULAR; INTRAVENOUS at 07:50

## 2023-12-27 RX ADMIN — FENTANYL CITRATE 25 MCG: 50 INJECTION INTRAMUSCULAR; INTRAVENOUS at 08:23

## 2023-12-27 RX ADMIN — ONDANSETRON 4 MG: 2 INJECTION INTRAMUSCULAR; INTRAVENOUS at 07:48

## 2023-12-27 RX ADMIN — SODIUM CHLORIDE, SODIUM LACTATE, POTASSIUM CHLORIDE, AND CALCIUM CHLORIDE 125 ML/HR: .6; .31; .03; .02 INJECTION, SOLUTION INTRAVENOUS at 06:25

## 2023-12-27 RX ADMIN — FENTANYL CITRATE 25 MCG: 50 INJECTION INTRAMUSCULAR; INTRAVENOUS at 08:19

## 2023-12-27 RX ADMIN — CEFAZOLIN SODIUM 1000 MG: 1 SOLUTION INTRAVENOUS at 07:41

## 2023-12-27 NOTE — OP NOTE
"OPERATIVE REPORT  PATIENT NAME: Geneva Lozada    :  1935  MRN: [id number]  Pt Location: [facility] OR ROOM 06    SURGERY DATE: 2023    Surgeons and Role:     * Rasta Gaston MD - Primary     * Wanda Romayne Stengel Hohenshilt, PA-C - Assisting    Preop Diagnosis:  Urge incontinence [N39.41]    Post-Op Diagnosis Codes:     * Urge incontinence [N39.41]    Procedure(s):  IMPLANTATION NEUROSTIMULATOR ELECTRODE ARRAY SACRAL NERVE; INSERTION  NEUROSTIMULATOR; FLUORO; ELECTRONIC ANALYSIS    Specimen(s):  * No specimens in log *    Estimated Blood Loss:   Minimal    Drains:  * No LDAs found *    Anesthesia Type:   IV Sedation with Anesthesia    Operative Indications:  Urge incontinence [N39.41]      Operative Findings:  S3 needle location was confirmed by direct fluoroscopic observation of the lifting of the perineum or “bellowing,” and plantar flexion of the great toe utilizing the j-hook patient cable and the external test stimulator on the LEFT side and the stimulator implant was placed on the RIGHT side.    Complications:   None    Procedure and Technique:  A ground pad was placed on the bottom of the patient's foot and the long test stimulation cable was connected to the ground pad and the external test stimulator. The medial edges of the foramina were identied and marked. The needle entry point was determined on the patients right side and local injection was administered. The foramen needle was placed in the superior, medial aspect of the S3 foramen and appropriate needle depth. A guide wire was placed with the needle and a j-hook cable was placed on the distal end of the guide wire. Needle location was confirmed by direct observation of the lifting of the perineum or \"bellowing\".  A c-arm was moved into AP position to provide confirmation of the correct placement. The c-arm was then moved into the lateral position to identify the S3 foramen. Once again proper S3 needle location was also confirmed by direct " fluoroscopic observation of the lifting of the perineum or “bellowing,” and plantar flexion of the great toe utilizing the j-hook patient cable and the external test stimulator.     The foramen needle stylet was removed by sliding over the directional guide. A small incision was made peripherally to the directional guide through the skin. The lead introducer with dilator was placed over the directional guide and utilizing fluoroscopic guidance, the lead introducer was advanced until the radiopaque luna was half-way through the foramen. The dilator was removed along with the directional guide. The tined lead was placed through the introducer until electrodes two and three straddled the anterior surface of the sacrum. All four electrodes were tested, observing “león” and plantar  exion of the great toe utilizing the j-hook patient cable and the external test stimulator. After satisfactory lead positioning was confirmed, the introducer was retracted over the lead under continuous Fluoroscopy, deploying the tines into presacral tissue. Retesting of all four electrodes con rming appropriate responses was completed.     A potential internal neurostimulator pocket site was identied below the iliac crest and lateral to the sacrum on the patients RIGHT side. Local was administered and an incision was made into the subcutaneous tissue creating a connection site. Blunt dissection was used to create a small pocket with hemostasis achieved. A tunneling tool with sheath was placed from the lead exit site subcutaneously to the small incised pocket site. The tunneling tool was removed and the lead was fed through the sheath, exiting at the pocket site. The sheath was removed. The lead was cleaned and dried. The lead was placed into the battery device and secured. The setscrew was tightened with the provided sterile screwdriver until audible clicks were heard. The connection components and battery were placed into the incision.  The incisions were irrigated with antibiotic solution in sterile water and closed 2-0 vicryl with subcuticular sutures and 4-0 Moncryl skin sutures. The incisions were secured with surgical glue.    The sponge, needle and instrument count were correct x 2. The patient tolerated the procedure well. She was awakened from anesthesia and transferred to the recovery room in stable condition. A qualified resident physician was not available.     I was present for the entire procedure.    Patient Disposition:  PACU         SIGNATURE: Rasta Gaston MD  DATE: December 27, 2023  TIME: 10:51 AM

## 2023-12-27 NOTE — ANESTHESIA POSTPROCEDURE EVALUATION
Post-Op Assessment Note    CV Status:  Stable    Pain management: adequate       Mental Status:  Alert and awake   Hydration Status:  Euvolemic   PONV Controlled:  Controlled   Airway Patency:  Patent     Post Op Vitals Reviewed: Yes      Staff: Anesthesiologist               BP      Temp      Pulse     Resp      SpO2      /67   Pulse 59   Temp 97.5 °F (36.4 °C) (Temporal)   Resp 16   Wt 47.2 kg (104 lb 0.9 oz)   SpO2 98%   BMI 17.32 kg/m²

## 2024-02-28 ENCOUNTER — OFFICE VISIT (OUTPATIENT)
Dept: GYNECOLOGY | Facility: CLINIC | Age: 89
End: 2024-02-28
Payer: MEDICARE

## 2024-02-28 ENCOUNTER — TELEPHONE (OUTPATIENT)
Dept: GYNECOLOGY | Facility: CLINIC | Age: 89
End: 2024-02-28

## 2024-02-28 VITALS
DIASTOLIC BLOOD PRESSURE: 72 MMHG | HEIGHT: 65 IN | WEIGHT: 108 LBS | BODY MASS INDEX: 17.99 KG/M2 | SYSTOLIC BLOOD PRESSURE: 108 MMHG

## 2024-02-28 DIAGNOSIS — E11.51 DIABETES MELLITUS WITH PERIPHERAL CIRCULATORY DISORDER (HCC): ICD-10-CM

## 2024-02-28 DIAGNOSIS — B37.9 CANDIDIASIS: Primary | ICD-10-CM

## 2024-02-28 PROCEDURE — 99213 OFFICE O/P EST LOW 20 MIN: CPT | Performed by: OBSTETRICS & GYNECOLOGY

## 2024-02-28 RX ORDER — CLOTRIMAZOLE AND BETAMETHASONE DIPROPIONATE 10; .64 MG/G; MG/G
CREAM TOPICAL 2 TIMES DAILY
Qty: 45 G | Refills: 1 | Status: SHIPPED | OUTPATIENT
Start: 2024-02-28

## 2024-02-28 RX ORDER — FLUCONAZOLE 150 MG/1
TABLET ORAL
Qty: 2 TABLET | Refills: 0 | Status: SHIPPED | OUTPATIENT
Start: 2024-02-28 | End: 2024-03-01

## 2024-02-28 RX ORDER — FLUCONAZOLE 150 MG/1
TABLET ORAL
Qty: 2 TABLET | Refills: 0 | Status: SHIPPED | OUTPATIENT
Start: 2024-02-28 | End: 2024-02-28

## 2024-02-28 NOTE — TELEPHONE ENCOUNTER
LM on pts. VM inquiring what her urinary issues were for her appt. Today. Patient has  been referred to Dr. Gaston.

## 2024-02-28 NOTE — PROGRESS NOTES
Assessment/Plan:         Diagnoses and all orders for this visit:    Candidiasis  -     fluconazole (DIFLUCAN) 150 mg tablet; 1 tablet now; repeat 48 hours  -     clotrimazole-betamethasone (LOTRISONE) 1-0.05 % cream; Apply topically 2 (two) times a day    Diabetes mellitus with peripheral circulatory disorder (HCC)      Subjective:      Patient ID: Geneva Lozada is a 88 y.o. female.    HPI patient presents the office complaining of vaginal pain.  She denies any vaginal discharge or bleeding.  Denies any dysuria, hematuria urgency.  She does see urogynecology.  She has neurostimulation placed.  Patient is status post POLLO/BSO.  She has been using Estrace cream half a gram vaginal twice weekly.    The following portions of the patient's history were reviewed and updated as appropriate: She  has a past medical history of Allergies, Ambulates with cane, Anxiety, Asthma, Chronic interstitial cystitis, Colon polyp, Depression, Disease of thyroid gland, GERD (gastroesophageal reflux disease), Lichen sclerosus et atrophicus, and Osteoporosis.  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) 02/23/2022    GERD (gastroesophageal reflux disease)     Allergic     Diabetes mellitus (HCC) 01/03/2022    Adenomatous polyp of stomach 01/03/2022    Diabetes mellitus with peripheral circulatory disorder (HCC) 09/17/2021    Thyroid disorder 05/09/2012     She  has a past surgical history that includes Appendectomy; Bladder surgery; Cataract extraction; Colonoscopy (1999); Replacement total knee (Left, 2008); Total abdominal hysterectomy (1972); Incisional breast biopsy (Bilateral); Breast biopsy (Bilateral); Colonoscopy (2006); Colonoscopy (2010); Colonoscopy (2013); Colonoscopy (12/15/2017); Replacement total knee (Right, 2010); Total shoulder  replacement (Right, 2012); Upper gastrointestinal endoscopy (10/14/2021); TONSILECTOMY AND ADNOIDECTOMY; Partial gastrectomy (04/2022); EGD (05/23/2023); EGD (03/01/2022); EGD (01/28/2022); and Sacral nerve stimulator placement (N/A, 12/27/2023).  Her family history includes Breast cancer in her cousin and mother; Heart attack in her mother; Heart disease in her father; Lung cancer in her father; No Known Problems in her daughter, maternal aunt, maternal grandfather, maternal grandmother, paternal aunt, paternal aunt, paternal grandfather, paternal grandmother, and sister.  She  reports that she quit smoking about 67 years ago. Her smoking use included cigarettes. She has never used smokeless tobacco. She reports that she does not drink alcohol and does not use drugs.  Current Outpatient Medications   Medication Sig Dispense Refill    clotrimazole-betamethasone (LOTRISONE) 1-0.05 % cream Apply topically 2 (two) times a day 45 g 1    fluconazole (DIFLUCAN) 150 mg tablet 1 tablet now; repeat 48 hours 2 tablet 0    Calcium-Vitamin D-Vitamin K (VIACTIV PO) Take by mouth in the morning      clobetasol (TEMOVATE) 0.05 % cream Apply topically 2 (two) times a day Use in thin layer once weekly (Patient taking differently: Apply topically daily as needed Use in thin layer once weekly) 60 g 1    Cyanocobalamin (VITAMIN B-12) 500 MCG SUBL Place under the tongue in the morning      DULoxetine (CYMBALTA) 20 mg capsule Take 20 mg by mouth daily      estradiol (ESTRACE) 0.1 mg/g vaginal cream Insert 1 g into the vagina once a week 42.5 g 3    levothyroxine 75 mcg tablet Take 88 mcg by mouth daily      Melatonin 3 MG CAPS Take 10 mg by mouth daily      montelukast (SINGULAIR) 10 mg tablet Take 10 mg by mouth in the morning      Multiple Vitamin (multivitamin) tablet Take 1 tablet by mouth daily      naproxen sodium (Aleve) 220 MG tablet Take 440 mg by mouth daily      polyethylene glycol (MIRALAX) 17 g packet Take 17 g by mouth  "daily       No current facility-administered medications for this visit.     Current Outpatient Medications on File Prior to Visit   Medication Sig    Calcium-Vitamin D-Vitamin K (VIACTIV PO) Take by mouth in the morning    clobetasol (TEMOVATE) 0.05 % cream Apply topically 2 (two) times a day Use in thin layer once weekly (Patient taking differently: Apply topically daily as needed Use in thin layer once weekly)    Cyanocobalamin (VITAMIN B-12) 500 MCG SUBL Place under the tongue in the morning    DULoxetine (CYMBALTA) 20 mg capsule Take 20 mg by mouth daily    estradiol (ESTRACE) 0.1 mg/g vaginal cream Insert 1 g into the vagina once a week    levothyroxine 75 mcg tablet Take 88 mcg by mouth daily    Melatonin 3 MG CAPS Take 10 mg by mouth daily    montelukast (SINGULAIR) 10 mg tablet Take 10 mg by mouth in the morning    Multiple Vitamin (multivitamin) tablet Take 1 tablet by mouth daily    naproxen sodium (Aleve) 220 MG tablet Take 440 mg by mouth daily    polyethylene glycol (MIRALAX) 17 g packet Take 17 g by mouth daily     No current facility-administered medications on file prior to visit.     She is allergic to iodine - food allergy, azelastine, cephalexin, citalopram, codeine, doxycycline, erythromycin, ethanol, fluoxetine, meloxicam, meperidine, nabumetone, ofloxacin, other, penicillins, pregabalin, duloxetine, and estrone..    Review of Systems      Objective:      /72   Ht 5' 5\" (1.651 m)   Wt 49 kg (108 lb)   BMI 17.97 kg/m²          Physical Exam  Vitals reviewed.   Abdominal:      General: There is no distension.      Palpations: Abdomen is soft. There is no mass.      Tenderness: There is no abdominal tenderness. There is no guarding or rebound.      Hernia: No hernia is present. There is no hernia in the left inguinal area or right inguinal area.   Genitourinary:     General: Normal vulva.      Labia:         Right: No rash, tenderness or lesion.         Left: No rash, tenderness or " lesion.       Comments: Uterus and cervix surgically absent.  No palpable adnexal masses or tenderness.  Thick white vaginal discharge present.  Sure swab culture obtained.  No cystocele or rectocele.  [place]'s and [place]'s glands normal.  Urethral orifice normal.  Lymphadenopathy:      Lower Body: No right inguinal adenopathy. No left inguinal adenopathy.   Neurological:      Mental Status: She is alert.

## 2024-05-03 ENCOUNTER — EVALUATION (OUTPATIENT)
Dept: PHYSICAL THERAPY | Facility: REHABILITATION | Age: 89
End: 2024-05-03
Payer: MEDICARE

## 2024-05-03 DIAGNOSIS — R39.15 URINARY URGENCY: Primary | ICD-10-CM

## 2024-05-03 DIAGNOSIS — R10.2 PELVIC PAIN: ICD-10-CM

## 2024-05-03 DIAGNOSIS — M79.18 MYOFASCIAL PAIN DYSFUNCTION SYNDROME: ICD-10-CM

## 2024-05-03 PROCEDURE — 97161 PT EVAL LOW COMPLEX 20 MIN: CPT | Performed by: PHYSICAL THERAPIST

## 2024-05-03 PROCEDURE — 97140 MANUAL THERAPY 1/> REGIONS: CPT | Performed by: PHYSICAL THERAPIST

## 2024-05-03 NOTE — PROGRESS NOTES
PT Evaluation     Today's date: 5/3/2024  Patient name: Geneva Lozada  : 1935  MRN: [id number]  Referring provider: Rasta Gaston MD  Dx:   Encounter Diagnosis     ICD-10-CM    1. Urinary urgency  R39.15       2. Myofascial pain dysfunction syndrome  M79.18       3. Pelvic pain  R10.2           Start Time: 942  Stop Time: 1050  Total time in clinic (min): 68 minutes    Assessment  Assessment details: Patient is a 88 y.o. female who presents to physical therapy with diagnosis of myofascial pain dysfunction. Internal assessment was performed following verbal consent from pt. Functional impairments include  urinary urgency, urinary frequency, urge incontinence, pelvic pain and difficulty sitting.. Physical findings include PFM tension/tightness in 1st layer muscles and poor coordination among breathing, core, and pelvic muscles.  Patient would benefit from formal physical therapy to address impairments as detailed, decrease pain, and restore maximal level of function for all home, work, and mobility tasks. Educated patient regarding plan of care and answered all patient questions to patient satisfaction. Thank you for this referral.    Impairments: abnormal muscle tone and lacks appropriate home exercise program    Goals  Short term goals:  Pt will be able to delay urge at least 5 minutes in 6 weeks to decrease fall risk and decrease incontinence   Pt will demonstrate good understanding of urge delay techniques in 6 weeks  Pt will extend intervoid period to 2 hours or greater in 6 weeks.    Long term goals:  Pt will be compliant with HEP by discharge.  Pt will be able to delay urge for greater than 10 minutes by discharge decrease fall risk and decrease incontinence   Pt will dec nighttime voiding to 1-2 times a night to decrease fall risk and demonstrated improved urgency control  Pt will report <3 instances of leakage per month.      Plan  Plan details: Cont per POC 1-2x/week for 8-12 weeks   Patient would  benefit from: skilled physical therapy  Planned modality interventions: biofeedback, cryotherapy and thermotherapy: hydrocollator packs  Planned therapy interventions: joint mobilization, manual therapy, strengthening, stretching, therapeutic activities, therapeutic exercise, functional ROM exercises, flexibility, home exercise program, abdominal trunk stabilization and balance  Treatment plan discussed with: patient      PT Pelvic Floor Subjective:   History of Present Illness:   Pt is coming to pelvic PT. She reports she has a bladder stimulator implanted in Dec 27,2024 due to urinary urgency. She has not been able to turn it on and use it because it hurts in her when she turns it on in her vaginal area. Will be a 7/10. Once she turns it off it resolves.   Prior to her bladder stimulator she is not sure if she had pain. She had an internal exam and was told her PFM are tight and that is why she is having pain with the bladder stimulator.     She also gets vaginal pressure and lower abdominal pressure when she sits, 7-8/10, 3-5 mins pain/pressuree will begin and worsen as she sits. .   Improved when she stands, she can still feel it but it is less.   If she is lying flat in bed she does not have any pain  She also feels she has abdominal weakness.     She walks with SPC. Had a recent fall 2 weeks ago went to ER, no fractures. Currently in PT at [facility] for her balance. She fell because of urinary urgency, rushed to the bathroom and tripped and fell.        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    Stress severity: pt's kids would like her to move and give up her home and upset because she is falling.    History of Depression: no  Diet and Exercise:    Diet:balanced nutrition    Bike daily  Chair exercises   OB/ gyn History    Gestational History:     Prior Pregnancy: Yes      Number of prior pregnancies: 3    Number of term  "pregnancies: 3    Delivery Type: vaginal delivery      Menstrual History:      Menopausal: menopause  Bladder Function:     Voiding Difficulties positive for: urgency, frequent urination, hesitancy and straining      Voiding Difficulties negative for: incomplete emptying       Voiding Difficulties comments:     Voiding frequency: every 1-2 hours    Urinary leakage: urine leakage    Urinary leakage aggravated by: walking to the bathroom and post-void dribble    Urinary leakage not aggravated by: coughing, sneezing, hearing running water, key-in-the-door syndrome and laughing    Nocturia (episodes per night): 2 and 3    Painful urination: No      Fluid Intake Type:  Tea, water and coffee    Intake (ounces):     Intake (ounces) comment: Unsure of water intake   She feels she has more frequency at night even though she drinks during the day  3 cups of decaf coffee throughout the day  1 glass of diet iced tea a day Daily: 2Weekly: 2  Incontinence Management:     Pads/Diaper Use:  Day    Pads/Diapers Additional Comments: liner  Bowel Function:     Voiding DIfficulties: urgency and constipation      Bowel Function comments:  Takes a laxative     Bowel frequency: daily    Uses \"squatty potty\": no Squatty Potty  Sexual Function:     Sexually Active:  Not sexually active  Patient Goals:     Other patient goals:  Use my bladder stimulator without pain      Objective    Pt provided consent prior to and throughout initiation of external/internal assessment  Pt declined second person in the room      Position: supine exam      General Perineum Exam:   Perineum intact: Y  Pelvic organ prolapse at rest: N     Visual Inspection of Perineum:   Excursion of perineal body in cephalad direction with contraction of pelvic floor muscles (PFM): good  Excursion of perineal body in caudal direction with relaxation of pelvic floor muscles (PFM): good  Involuntary relaxation with bearing down: good    Involuntary contraction with coughing: " N  Anal [place]: present  [place]ton swab test: non-tender  Sensation: intact     Tenderness to palpation:  Layer 1: 3-6, 9-6 oclock  Performed internal manual STM/TPR to gently stretch PFM. Pt required cueing to relax ADD during manual therapy which decreased pain.   PT did not progress past 1st layer muscles due to increased PFM tension and pt discomfort.   Pain when sitting improved after manual therapy.     Poor diaphragmatic breathing, inc chest elevation  Pt sits with legs crossed with inc pelvic pain, improved with feet hip width apart.       Precautions: FALL RISK, bladder stimulant, hx of POLLO, hx of bladder lift strategy  Impairments/functional limitations: urinary urgency, urinary incontinence, vaginal pain  Daily Treatment Diary    Manuals 5/3         External assess performed         Internal assess performed         Internal manual therapy STM/TPR Layer 1: 3-6, 9-6 oclock         Objective assessment nv         Neuro Re-Ed          TAC          PF          TAC+PF          TAC with march          TAC with alt knee fall out          TAC with ball squeeze          TAC with SLR          TAC with s/l ABD          TAC with bridge          TAC with clamshells                     360 deg breathing *         Ther Ex          Supine piri stretch          LTR          Seated piri stretch          Supine butterfly stretch unilateral                                                  Ther Activity          Self internal STM   Review, issued handout, Layer 1 3-6, 9-6 o'clock         Bladder Diary           Urge delay:QF          Urge delay: HR          Freeze, breathe, squeeze           The knack           Sitting posture: feet hip width apart review         Gait Training                              Modalities

## 2024-05-08 ENCOUNTER — TELEPHONE (OUTPATIENT)
Age: 89
End: 2024-05-08

## 2024-05-08 NOTE — TELEPHONE ENCOUNTER
Patient contacted office to see if a medication can be prescribed as she believes she has a vaginal infection. States she has a yellow discharge and vaginal soreness, especially when she urinates.

## 2024-05-10 ENCOUNTER — OFFICE VISIT (OUTPATIENT)
Dept: PHYSICAL THERAPY | Facility: REHABILITATION | Age: 89
End: 2024-05-10
Payer: MEDICARE

## 2024-05-10 DIAGNOSIS — R10.2 PELVIC PAIN: ICD-10-CM

## 2024-05-10 DIAGNOSIS — B37.9 CANDIDIASIS: ICD-10-CM

## 2024-05-10 DIAGNOSIS — M79.18 MYOFASCIAL PAIN DYSFUNCTION SYNDROME: ICD-10-CM

## 2024-05-10 DIAGNOSIS — R39.15 URINARY URGENCY: Primary | ICD-10-CM

## 2024-05-10 PROCEDURE — 97140 MANUAL THERAPY 1/> REGIONS: CPT | Performed by: PHYSICAL THERAPIST

## 2024-05-10 PROCEDURE — 97112 NEUROMUSCULAR REEDUCATION: CPT | Performed by: PHYSICAL THERAPIST

## 2024-05-10 RX ORDER — CLOTRIMAZOLE AND BETAMETHASONE DIPROPIONATE 10; .64 MG/G; MG/G
CREAM TOPICAL 2 TIMES DAILY
Qty: 45 G | Refills: 1 | Status: SHIPPED | OUTPATIENT
Start: 2024-05-10

## 2024-05-10 NOTE — PROGRESS NOTES
Daily Note     Today's date: 5/10/2024  Patient name: Geneva Lozada  : 1935  MRN: [id number]  Referring provider: Rasta Gaston MD  Dx:   Encounter Diagnosis     ICD-10-CM    1. Urinary urgency  R39.15       2. Myofascial pain dysfunction syndrome  M79.18       3. Pelvic pain  R10.2           Start Time: 1204  Stop Time: 1259  Total time in clinic (min): 55 minutes    Subjective: Pt reports she has a bladder infection. She felt sore on Saturday after last visit. She let it go for a few days and did her HEP. She felt she got more sore and discontinued the exercises. She was not evaluated by her OBGYN or had a urine culture but called in for a prescription. She has not gotten the prescription yet, is going to be sent in today. She reports when she did her stretches internally she had increased soreness. PT informed pt she may have been applying too much pressure as the instructions state discomfort should to worsen during the stretch and pain should be less than 5/10. PT discussed she could do her stretches again in the future but dec frequency to 2-3x/week. However, pt to hold off on internal stretches now until after she finished her medications and her symptoms return to baseline. Pt verbalized understanding of this. Pt is in PT elsewhere for her L hip and balance.     Objective: See treatment diary below    Pt provided verbal consent prior to and throughout objective assessment     Posture: thoracic kyphosis    Tenderness:  Piriformis: + L  Obturator Internus: + L>R  Post pelvic floor: +L>R  Adductors: ++ L>R, entire length  Tenderness in linea alba: mid and distal 1/3rd     Lumbar AROM   Min flex limitation  Max ext limitation with increased pelvic pressure     SLS  L: poor load transfer  R: poor load transfer     Other Comments:  Fair TAC, compensates with abdominal bracing       MMT:   Left Right   Hip flex 4-, inc pelvic pressure  4-, knee pain   Hip ABD 3- 3-   Hip ext nt nt   Hip IR 4 4   Hip ER 3+  3-   Knee flex 4 4   Knee ext 4+ 4+   Ankle DF 4+ 4+               Special Tests:   Left Right   CHRISTA +, pain since fall -   FADIR +, pain since fall -   Post Thigh Thrust +, pain since fall -        Pop angle  45 deg  deg   Hip IR PROM 35 deg 24 deg   Hip ER PROM 36 deg  47 deg           Assessment: Pt presents with PFM, abdominal and LE tightness and tenderness s well as proximal hip weakness and poor breathing mechanics. Pt most challenged with 360 deg breathing, compensating with abdominal contractions and chest elevation. Pt demo ability pt perform with improved technique when prone. Pt challenged in supine requiring significant vc/tc for correct technique and pt able to correctly reproduce it 25% of the time. Pt to focus on 360 deg breathing in supine/seated 3-5 x/day focusing on 2-3 purposeful breaths to improve awareness and for PFM relaxation. Pt reports 7/10 pain at beginning of session and end of session. Tolerated treatment well. Patient would benefit from continued PT      Plan: Continue per plan of care.     Access Code: [email or web address]  URL: [email or web address]  Date: 05/10/2024  Prepared by: Sahara Swann    Exercises  - Supine Diaphragmatic Breathing  - 3-5 x daily - 5 reps  - Unilateral Supported Supine Butterfly Stretch  - 2 x daily - 2 reps - 30 secs hold     Precautions: FALL RISK, bladder stimulant, hx of POLLO, hx of bladder lift surgergy  Impairments/functional limitations: urinary urgency, urinary incontinence, vaginal pain  Daily Treatment Diary    Manuals 5/3 5/10        External assess performed         Internal assess performed         Internal manual therapy STM/TPR Layer 1: 3-6, 9-6 oclock         Objective assessment nv performed        OI/post pelvi floor STM          Adductor STM/skin rolling           Neuro Re-Ed          TAC          PF          TAC+PF          TAC with march          TAC with alt knee fall out          TAC with ball squeeze          TAC with SLR         "  TAC with s/l ABD          TAC with bridge          TAC with clamshells                     360 deg breathing * Review, supine, prone, seated        Ther Ex          Supine piri stretch          LTR          Seated piri stretch          Supine butterfly stretch unilateral  1\"x1 ea                                                Ther Activity          Self internal STM   Review, issued handout, Layer 1 3-6, 9-6 o'clock Review, hold         Bladder Diary           Urge delay:QF          Urge delay: HR          Freeze, breathe, squeeze           The knack           Sitting posture: feet hip width apart review         Gait Training                              Modalities                                        "

## 2024-05-17 ENCOUNTER — OFFICE VISIT (OUTPATIENT)
Dept: PHYSICAL THERAPY | Facility: REHABILITATION | Age: 89
End: 2024-05-17
Payer: MEDICARE

## 2024-05-17 DIAGNOSIS — R10.2 PELVIC PAIN: ICD-10-CM

## 2024-05-17 DIAGNOSIS — R39.15 URINARY URGENCY: Primary | ICD-10-CM

## 2024-05-17 DIAGNOSIS — M79.18 MYOFASCIAL PAIN DYSFUNCTION SYNDROME: ICD-10-CM

## 2024-05-17 PROCEDURE — 97110 THERAPEUTIC EXERCISES: CPT | Performed by: PHYSICAL THERAPIST

## 2024-05-17 PROCEDURE — 97140 MANUAL THERAPY 1/> REGIONS: CPT | Performed by: PHYSICAL THERAPIST

## 2024-05-17 PROCEDURE — 97112 NEUROMUSCULAR REEDUCATION: CPT | Performed by: PHYSICAL THERAPIST

## 2024-05-17 NOTE — PROGRESS NOTES
Daily Note     Today's date: 2024  Patient name: Geneva Lozada  : 1935  MRN: [id number]  Referring provider: Rasta Gaston MD  Dx:   Encounter Diagnosis     ICD-10-CM    1. Urinary urgency  R39.15       2. Myofascial pain dysfunction syndrome  M79.18       3. Pelvic pain  R10.2           Start Time: 0950  Stop Time: 1045  Total time in clinic (min): 55 minutes    Subjective: Pt reports she feels a little stronger every week. She was given a medication for her bladder infection and is feeling a little better. She is practicing breathing with abdominal relaxation. Pt reports her pain today is less than it was last visit, rated 5/10. Pt reports 4/10 pain at end of session.      Objective: See treatment diary below      Assessment: Patient performed recumbent bike aerobic exercise to increase blood flow to the area being treated, prepare the muscles for strength training and stretching, improve overall tolerance to activity, and aerobic endurance. PT cued pt to maintain hip/kne ein neutral alignment during forward revolution after observing R kne crossing midline while on bike. Pt continues to be challenged with abdominal drawing in during 360 deg breathing requriing significant vc/tc for corrcet technqie. Pt demo's improved technique in supine with HOB elevated, most challenged in seated and with exercises requiring cueing for abdominal relaxation.  PT progressed POC with LE flexibility exercises and focus on PFM relaxation. Pt experienced discomfort with with L supine fig 4 stretch. PT cued pt to dec intensity of stretch which allowed pt to complete painfree. PT initiated TAC with focus on coordination with breathing. Pt initially performed TAC with compensation with chest elevation and upper abdominal drawing in, improved with tactile cueing medial to ASIS b/l.  Pt presents with b/l adductor tightness, improved with MT.  Tolerated treatment well. Patient would benefit from continued PT      Plan:  "Continue per plan of care.          Precautions: FALL RISK, bladder stimulant, hx of POLLO, hx of bladder lift surgergy  Impairments/functional limitations: urinary urgency, urinary incontinence, vaginal pain, R>L hip weakness, OI/post pelvic floor  tenderness, adductor tenderness, linea alba tenderness, poor load transfer  Daily Treatment Diary    Manuals 5/3 5/10 5/17       External assess performed         Internal assess performed         Internal manual therapy STM/TPR Layer 1: 3-6, 9-6 oclock         Objective assessment nv performed        OI/post pelvic floor STM   *       Adductor STM/skin rolling    10 mins        Neuro Re-Ed          TAC   Review, 10\"x10       PF          TAC+PF          TAC with march          TAC with alt knee fall out          TAC with ball squeeze          TAC with SLR          TAC with s/l ABD          TAC with bridge          TAC with clamshells                     360 deg breathing * Review, supine, prone, seated Review, seated, during eercises       Ther Ex          Bike    5 mins L1                  Supine piri stretch   30\"x2 ea       Supine fig 4 stretch   30\"x2 ea, painfree range       Supine butterfly stretch unilateral  1\"x1 ea 30'x1 ea with pillow       Seated hamstring stretch   30\"x2 ea       Seated happy baby with 360 deg breathing   5''x5        Seated itz pose  with 360 deg breath   1 min                                               Ther Activity          Self internal STM   Review, issued handout, Layer 1 3-6, 9-6 o'clock Review, hold         Bladder Diary           Urge delay:QF          Urge delay: HR          Freeze, breathe, squeeze           The knack           Sitting posture: feet hip width apart review         Gait Training                              Modalities                                          "

## 2024-05-24 ENCOUNTER — OFFICE VISIT (OUTPATIENT)
Dept: PHYSICAL THERAPY | Facility: REHABILITATION | Age: 89
End: 2024-05-24
Payer: MEDICARE

## 2024-05-24 DIAGNOSIS — M79.18 MYOFASCIAL PAIN DYSFUNCTION SYNDROME: ICD-10-CM

## 2024-05-24 DIAGNOSIS — R39.15 URINARY URGENCY: Primary | ICD-10-CM

## 2024-05-24 DIAGNOSIS — R10.2 PELVIC PAIN: ICD-10-CM

## 2024-05-24 PROCEDURE — 97140 MANUAL THERAPY 1/> REGIONS: CPT | Performed by: PHYSICAL THERAPIST

## 2024-05-24 PROCEDURE — 97112 NEUROMUSCULAR REEDUCATION: CPT | Performed by: PHYSICAL THERAPIST

## 2024-05-24 PROCEDURE — 97530 THERAPEUTIC ACTIVITIES: CPT | Performed by: PHYSICAL THERAPIST

## 2024-05-24 PROCEDURE — 97110 THERAPEUTIC EXERCISES: CPT | Performed by: PHYSICAL THERAPIST

## 2024-05-24 NOTE — PROGRESS NOTES
"Daily Note     Today's date: 2024  Patient name: Geneva Lozada  : 1935  MRN: [id number]  Referring provider: Rasta Gaston MD  Dx:   Encounter Diagnosis     ICD-10-CM    1. Urinary urgency  R39.15       2. Myofascial pain dysfunction syndrome  M79.18       3. Pelvic pain  R10.2           Start Time: 1101  Stop Time: 1158  Total time in clinic (min): 57 minutes    Subjective: Pt reports she is doing \"pretty good.\" Pt reports her pain is better in her vagina, rated 3-4/10. Pt reports she thinks she still has an infection because it burns when she pees and  she feels sore. She will call her physician for an appointment. She reports when she inserts her finger in her vagina she does not notice any tightness or discomfort now. She still has pain with sitting but not as much. It is worse on a hard chair. PT recommends using a cushion to relieve pressure.       Objective: See treatment diary below      Assessment: PT cued pt on voiding mechanics with simulated squat position an focus on breathing to start stream of urine versus pushing/straining. Pt verbalizes issued with urinary urgency. PT initiated modified bladder diary for pt to complete to assess bladder patterns.PT considered cueing for strategies to control urinary urgency however pt ws feeling overwhelmed therefore pt focus for this week will be on voiding posture/mechanics and breathing as well as completing bladder diary. Patient performed recumbent bike aerobic exercise to increase blood flow to the area being treated, prepare the muscles for strength training and stretching, improve overall tolerance to activity, and aerobic endurance. PT also initiated proximal hip strengthening with coordination of TAC and breathing and cueing for exhalation between each rep to avoid over-activation of muscles.  Tolerated treatment well. Patient would benefit from continued PT      Plan: Continue per plan of care.     Access Code: [email or web address]  URL: " "[email or web address]  Date: 05/24/2024  Prepared by: Sahara Swann    Exercises  - Supine Diaphragmatic Breathing  - 3-5 x daily - 5 reps  - Unilateral Supported Supine Butterfly Stretch  - 2 x daily - 2 reps - 30 secs hold  - Supine Piriformis Stretch with Foot on Ground  - 2 x daily - 2 reps - 30 secs hold  - Supine Figure 4 Piriformis Stretch  - 2 x daily - 2 reps - 30 secs hold  - Pelvic Floor Relaxation Supported Sitting in Chair  - 2 x daily - 1 min hold  - Seated Happy Baby With Trunk Flexion For Pelvic Relaxation  - 2 x daily - 5-10 reps - 5 secs hold  - Seated Hamstring Stretch  - 2 x daily - 2 reps - 30 secs hold  - Supine Bridge  - 1 x daily - 10 reps - 2-3 secs hold  - Supine March  - 1 x daily - 10 reps  - Bent Knee Fallouts with Alternating Legs  - 1 x daily - 10 reps  - Supine Hip Adduction Isometric with Ball  - 1 x daily - 10 reps - 5 secs hold     Precautions: FALL RISK, bladder stimulant, hx of POLLO, hx of bladder lift surgergy  Impairments/functional limitations: urinary urgency, urinary incontinence, vaginal pain, R>L hip weakness, OI/post pelvic floor  tenderness, adductor tenderness, linea alba tenderness, poor load transfer  Daily Treatment Diary    Manuals 5/3 5/10 5/17 5/24      External assess performed         Internal assess performed         Internal manual therapy STM/TPR Layer 1: 3-6, 9-6 oclock         Objective assessment nv performed        OI/post pelvic floor STM   * 8 mins      Adductor STM/skin rolling    10 mins        Neuro Re-Ed          TAC   Review, 10\"x10 5'x5      PF          TAC+PF          TAC with march    X10 ea      TAC with alt knee fall out    X10 ea      TAC with ball squeeze    5x10 ea      TAC with SLR          TAC with s/l ABD          TAC with bridge    3\"x10       TAC with clamshells                     360 deg breathing * Review, supine, prone, seated Review, seated, during eercises       Ther Ex          Bike    5 mins L1                " "  Supine piri stretch   30\"x2 ea HEP      Supine fig 4 stretch   30\"x2 ea, painfree range HEP      Supine butterfly stretch unilateral  1\"x1 ea 30'x1 ea with pillow HEP      Seated hamstring stretch   30\"x2 ea HEP      Seated happy baby with 360 deg breathing   5''x5  HEP      Seated itz pose  with 360 deg breath   1 min HEP                                              Ther Activity          Self internal STM   Review, issued handout, Layer 1 3-6, 9-6 o'clock Review, hold         Sitting with cushion    Review      Voiding mechanics    Review, position, feet elevated       Healthy bladder habits     NV      Urinary urgency     NV      Bladder Diary     Issued, review      Urge delay:QF          Urge delay: HR          Freeze, breathe, squeeze           The knack           Sitting posture: feet hip width apart review         Gait Training                              Modalities                                            "

## 2024-05-30 ENCOUNTER — OFFICE VISIT (OUTPATIENT)
Dept: GYNECOLOGY | Facility: CLINIC | Age: 89
End: 2024-05-30
Payer: MEDICARE

## 2024-05-30 VITALS
SYSTOLIC BLOOD PRESSURE: 110 MMHG | DIASTOLIC BLOOD PRESSURE: 50 MMHG | BODY MASS INDEX: 18.46 KG/M2 | WEIGHT: 110.8 LBS | HEIGHT: 65 IN

## 2024-05-30 DIAGNOSIS — L90.0 LICHEN SCLEROSUS ET ATROPHICUS: Primary | ICD-10-CM

## 2024-05-30 DIAGNOSIS — N95.2 ATROPHIC VAGINITIS: ICD-10-CM

## 2024-05-30 PROCEDURE — 99213 OFFICE O/P EST LOW 20 MIN: CPT | Performed by: OBSTETRICS & GYNECOLOGY

## 2024-05-30 RX ORDER — ESTRADIOL 0.1 MG/G
1 CREAM VAGINAL WEEKLY
Qty: 42.5 G | Refills: 3 | Status: SHIPPED | OUTPATIENT
Start: 2024-05-30

## 2024-05-30 NOTE — PROGRESS NOTES
"Ambulatory Visit  Name: Geneva Lozada      : 1935      MRN: [id number]  Encounter Provider: Marshall Guzman DO  Encounter Date: 2024   Encounter department: [facility] GYNECOLOGIC [facility]    Assessment & Plan   1. Lichen sclerosus et atrophicus  2. Atrophic vaginitis  -     estradiol (ESTRACE) 0.1 mg/g vaginal cream; Insert 1 g into the vagina once a week      History of Present Illness     Geneva Lozada is a 88 y.o. female who presents Ambulatory Visit  Name: Geneva Lozada      : 1935      MRN: [id number]  Encounter Provider: Marshall Guzman DO  Encounter Date: 2024   Encounter department: [facility]    Assessment & Plan   1. Lichen sclerosus et atrophicus  2. Atrophic vaginitis  -     estradiol (ESTRACE) 0.1 mg/g vaginal cream; Insert 1 g into the vagina once a week  Will increase Estrace cream to 1 g twice weekly for 1 month and then decrease to 1 g weekly  For the lichen sclerosus patient will apply clobetasol twice daily for 1 to 2 weeks    History of Present Illness     Geneva Lozada is a 88 y.o. female who presents Complaining of significant vaginal vulvar irritation for the past 2 months.  She is status post POLLO/BSO.  She has been using Estrace vaginal cream half a gram twice weekly.  She denies any vaginal discharge or bleeding.  Denies any abdominal pain or fever.    [unfilled]    Objective     /50   Ht 5' 5\" (1.651 m)   Wt 50.3 kg (110 lb 12.8 oz)   BMI 18.44 kg/m²     [unfilled]  Administrative Statements             Review of Systems    Objective     /50   Ht 5' 5\" (1.651 m)   Wt 50.3 kg (110 lb 12.8 oz)   BMI 18.44 kg/m²     Physical Exam  Vitals reviewed.   Abdominal:      General: There is no distension.      Palpations: Abdomen is soft. There is no mass.      Tenderness: There is no abdominal tenderness. There is no guarding or rebound.      Hernia: No hernia is present. There is no hernia " in the left inguinal area or right inguinal area.   Genitourinary:     Comments: Whitish discoloration extending from the clitoris to inferior aspect of both labia minora consistent with lichen sclerosis.  Vagina with atrophic changes.  Uterus and cervix surgically absent.  No palpable adnexal masses or tenderness.  Bartholin's and Urbank's glands normal.  Urethral orifice normal.  Lymphadenopathy:      Lower Body: No right inguinal adenopathy. No left inguinal adenopathy.   Neurological:      Mental Status: She is alert.       Administrative Statements

## 2024-05-31 ENCOUNTER — OFFICE VISIT (OUTPATIENT)
Dept: PHYSICAL THERAPY | Facility: REHABILITATION | Age: 89
End: 2024-05-31
Payer: MEDICARE

## 2024-05-31 DIAGNOSIS — R10.2 PELVIC PAIN: ICD-10-CM

## 2024-05-31 DIAGNOSIS — M79.18 MYOFASCIAL PAIN DYSFUNCTION SYNDROME: ICD-10-CM

## 2024-05-31 DIAGNOSIS — R39.15 URINARY URGENCY: Primary | ICD-10-CM

## 2024-05-31 PROCEDURE — 97112 NEUROMUSCULAR REEDUCATION: CPT | Performed by: PHYSICAL THERAPIST

## 2024-05-31 PROCEDURE — 97530 THERAPEUTIC ACTIVITIES: CPT | Performed by: PHYSICAL THERAPIST

## 2024-05-31 NOTE — PROGRESS NOTES
Daily Note     Today's date: 2024  Patient name: Geneva Lozada  : 1935  MRN: [id number]  Referring provider: Rasta Gaston MD  Dx:   Encounter Diagnosis     ICD-10-CM    1. Urinary urgency  R39.15       2. Myofascial pain dysfunction syndrome  M79.18       3. Pelvic pain  R10.2           Start Time: 1052  Stop Time: 1150  Total time in clinic (min): 58 minutes    Subjective: Pt saw gynecologist and was told she has atrophic vaginitis and lichens sclerosis resulting in dryness. She has been using estrogen suppositories for a while and was not told to change her medication but thinks he might have said to inc dosage. PT recommends pt clarify with physician her medication dosage.  She still feels soreness. Pt completed bladder diary.       Objective: See treatment diary below      Assessment: Pt voids 7-12 times a day and 1-4 times a night with intervoid interval of 1-4 hours.Pt does ont have urinary leakage documented. Pt has strong urge 2-3 voids a day usually related to coming home from an appt or leaving an appt or related to running water and intermittent streams 2-3 times a day. PT cued pt on typical intervoid interval of 204 hours and importance of voiding kat low urge, minimizing voiding JIC and spacing out fluid intake regularly in her day. PT initiated urge deferral strategy with FBS to allow pt to void at low urge and void within  2-4 hours of previous void to retrain bladder. Pt to focus on this during waking hours and progress to nighttime hours with goal of voiding at a low urge to decrease pt feeling need to rush to bathroom to decrease her fall risk. Pt verbalized understanding of this. PT performed 360 deg breathing with good mechanics. Pt demo compensation with glutes during PFM contraction, improved with vc/tc and able to perform 3-5 quick contractions of PFM correctly after cueing.   Tolerated treatment well. Patient would benefit from continued PT      Plan: Continue per plan of  "care.  Assess progress with urge deferral strategy.      Precautions: FALL RISK, bladder stimulant, hx of POLLO, hx of bladder lift surgergy  Impairments/functional limitations: urinary urgency, urinary incontinence, vaginal pain, R>L hip weakness, OI/post pelvic floor  tenderness, adductor tenderness, linea alba tenderness, poor load transfer  Daily Treatment Diary    Manuals 5/3 5/10 5/17 5/24 5/31     External assess performed         Internal assess performed         Internal manual therapy STM/TPR Layer 1: 3-6, 9-6 oclock         Objective assessment nv performed        OI/post pelvic floor STM   * 8 mins      Adductor STM/skin rolling    10 mins        Neuro Re-Ed          TAC   Review, 10\"x10 5'x5      PF     Review form, QF x5     TAC+PF          TAC with march    X10 ea      TAC with alt knee fall out    X10 ea      TAC with ball squeeze    5x10 ea      TAC with SLR          TAC with s/l ABD          TAC with bridge    3\"x10       TAC with clamshells                     360 deg breathing * Review, supine, prone, seated Review, seated, during eercises  review     Ther Ex          Bike    5 mins L1                  Supine piri stretch   30\"x2 ea HEP      Supine fig 4 stretch   30\"x2 ea, painfree range HEP      Supine butterfly stretch unilateral  1\"x1 ea 30'x1 ea with pillow HEP      Seated hamstring stretch   30\"x2 ea HEP      Seated happy baby with 360 deg breathing   5''x5  HEP      Seated itz pose  with 360 deg breath   1 min HEP                                              Ther Activity          Self internal STM   Review, issued handout, Layer 1 3-6, 9-6 o'clock Review, hold         Sitting with cushion    Review      Voiding mechanics    Review, position, feet elevated       Healthy bladder habits     NV Review, space out fluid intake, minimize void JIC      Urinary urgency     NV Review,      Bladder Diary     Issued, review review     Urge delay:QF          Urge delay: HR          Freeze, breathe, " squeeze           The knack           Sitting posture: feet hip width apart review         Gait Training                              Modalities

## 2024-06-03 ENCOUNTER — TELEPHONE (OUTPATIENT)
Age: 89
End: 2024-06-03

## 2024-06-03 NOTE — TELEPHONE ENCOUNTER
"Patient called office to verify medication application instructions for Estrace and Lotrisone. Advised to take Estrace \"1 g into the vagina once a week\" and Lotrisone \"apply topically 2 times a day\" She verbalized understanding and offers no further questions or concerns at this time.   "

## 2024-06-07 ENCOUNTER — OFFICE VISIT (OUTPATIENT)
Dept: PHYSICAL THERAPY | Facility: REHABILITATION | Age: 89
End: 2024-06-07
Payer: MEDICARE

## 2024-06-07 DIAGNOSIS — M79.18 MYOFASCIAL PAIN DYSFUNCTION SYNDROME: ICD-10-CM

## 2024-06-07 DIAGNOSIS — R10.2 PELVIC PAIN: ICD-10-CM

## 2024-06-07 DIAGNOSIS — R39.15 URINARY URGENCY: Primary | ICD-10-CM

## 2024-06-07 PROCEDURE — 97110 THERAPEUTIC EXERCISES: CPT | Performed by: PHYSICAL THERAPIST

## 2024-06-07 PROCEDURE — 97530 THERAPEUTIC ACTIVITIES: CPT | Performed by: PHYSICAL THERAPIST

## 2024-06-07 PROCEDURE — 97140 MANUAL THERAPY 1/> REGIONS: CPT | Performed by: PHYSICAL THERAPIST

## 2024-06-07 NOTE — PROGRESS NOTES
Daily Note     Today's date: 2024  Patient name: Geneva Lozada  : 1935  MRN: [id number]  Referring provider: Rasta Gaston MD  Dx:   Encounter Diagnosis     ICD-10-CM    1. Urinary urgency  R39.15       2. Myofascial pain dysfunction syndrome  M79.18       3. Pelvic pain  R10.2           Start Time: 1058  Stop Time: 1200  Total time in clinic (min): 62 minutes    Subjective: She reports se is doing pretty good. She is able to extend interval to 2-3 hours. Sometimes she drinks a lot and it is closer to 2 hours. She had a few instances of leakage when she went longer than 3 hours. She reports either way she had to rush to get to the bathroom. She was focusing on increasing her time between voiding and not necessarily voiding at a low urge. Pt reports even though she could delay her urge she would the need to rush when it came back. . Pt reports she still has vaginal pain, rated 4-5/10. Pt feels her symptoms are better. Pt reports the symptoms are constant. She tried to contact her physician re: medication and was told to use her estrace 1x/week.       Objective: See treatment diary below      Assessment: PT cued pt on importance of voiding at a low urge to manage her urgency and when she is successful at that she can the focus on increasing her time between voiding. Pt required significant verbal cueing on this technique to focus on first voiding at a low urge using her strategy and then trying to inc her intervoid interval as she gets more confident in her ability to void at a low urge. Pt to focus on inc intervoid interval between 1 hour and 1 hour and 15 mins as long as the primary focus is to void at a low urge. Pt was concerned because she felt she did a good job with er strategy. PT assured her she did as she was successful and delaying her urge however may have delayed it too long causing her to rush to the bathroom and having her switch focus to delaying urge for a shorter period of time allowing  her to void at a low urge. Pt verbalized understanding of this. PT progressed POC with proximal hip strengthening in standing with cueing for postural alignment and upright posture.   Tolerated treatment well. Patient would benefit from continued PT      Plan: Continue per plan of care.     Access Code: [email or web address]  URL: [email or web address]  Date: 06/07/2024  Prepared by: Sahara Swann    Exercises  - Supine Diaphragmatic Breathing  - 3-5 x daily - 5 reps  - Unilateral Supported Supine Butterfly Stretch  - 2 x daily - 2 reps - 30 secs hold  - Supine Piriformis Stretch with Foot on Ground  - 2 x daily - 2 reps - 30 secs hold  - Supine Figure 4 Piriformis Stretch  - 2 x daily - 2 reps - 30 secs hold  - Pelvic Floor Relaxation Supported Sitting in Chair  - 2 x daily - 1 min hold  - Seated Happy Baby With Trunk Flexion For Pelvic Relaxation  - 2 x daily - 5-10 reps - 5 secs hold  - Seated Hamstring Stretch  - 2 x daily - 2 reps - 30 secs hold  - Supine Bridge  - 3 x weekly - 10 reps - 2-3 secs hold  - Supine March  - 3 x weekly - 10 reps  - Bent Knee Fallouts with Alternating Legs  - 3 x weekly - 10 reps  - Supine Hip Adduction Isometric with Ball  - 3 x weekly - 10 reps - 5 secs hold  - Standing Hip Abduction with Counter Support  - 3 x weekly - 2 sets - 10 reps  - Standing March with Counter Support  - 2 sets - 10 reps  - Heel Raises with Counter Support  - 3 x weekly - 2 sets - 10 reps  - Side Stepping with Counter Support  - 3 x weekly - 5-10 reps     Precautions: FALL RISK, bladder stimulant, hx of POLLO, hx of bladder lift surgergy  Impairments/functional limitations: urinary urgency, urinary incontinence, vaginal pain, R>L hip weakness, OI/post pelvic floor  tenderness, adductor tenderness, linea alba tenderness, poor load transfer  Daily Treatment Diary    Manuals 5/3 5/10 5/17 5/24 5/31 6/7    External assess performed         Internal assess performed         Internal manual therapy STM/TPR Layer 1:  "3-6, 9-6 oclock         Objective assessment nv performed        OI/post pelvic floor STM   * 8 mins  Supine, ischiorectal fossa, b/l 6 mins    Adductor STM/skin rolling    10 mins    8 mins b/l    Neuro Re-Ed          TAC   Review, 10\"x10 5'x5      PF     Review form, QF x5     TAC+PF          TAC with march    X10 ea  HEP    TAC with alt knee fall out    X10 ea  HEP    TAC with ball squeeze    5x10 ea  HEP    TAC with SLR          TAC with s/l ABD          TAC with bridge    3\"x10   HEP    TAC with clamshells                     360 deg breathing * Review, supine, prone, seated Review, seated, during eercises  review     Ther Ex          Bike    5 mins L1    5 mins L1               Supine piri stretch   30\"x2 ea HEP      Supine fig 4 stretch   30\"x2 ea, painfree range HEP      Supine butterfly stretch unilateral  1\"x1 ea 30'x1 ea with pillow HEP      Seated hamstring stretch   30\"x2 ea HEP      Seated happy baby with 360 deg breathing   5''x5  HEP      Seated itz pose  with 360 deg breath   1 min HEP      Standing HR      X10     Standing hip ABD      X10 ea    Standing march      X10 ea    Side stepping      2 laps x5 steps ea way at bar    Ther Activity          Self internal STM   Review, issued handout, Layer 1 3-6, 9-6 o'clock Review, hold         Sitting with cushion    Review      Voiding mechanics    Review, position, feet elevated       Healthy bladder habits     NV Review, space out fluid intake, minimize void JIC      Urinary urgency     NV Review,  review    Bladder Diary     Issued, review review     Urge delay:QF          Urge delay: HR          Freeze, breathe, squeeze       Review, void at low urge    The knack           Sitting posture: feet hip width apart review         Gait Training                              Modalities                                                "

## 2024-06-20 ENCOUNTER — OFFICE VISIT (OUTPATIENT)
Dept: PHYSICAL THERAPY | Facility: REHABILITATION | Age: 89
End: 2024-06-20
Payer: MEDICARE

## 2024-06-20 DIAGNOSIS — R39.15 URINARY URGENCY: Primary | ICD-10-CM

## 2024-06-20 DIAGNOSIS — M79.18 MYOFASCIAL PAIN DYSFUNCTION SYNDROME: ICD-10-CM

## 2024-06-20 DIAGNOSIS — R10.2 PELVIC PAIN: ICD-10-CM

## 2024-06-20 PROCEDURE — 97140 MANUAL THERAPY 1/> REGIONS: CPT | Performed by: PHYSICAL THERAPIST

## 2024-06-20 PROCEDURE — 97530 THERAPEUTIC ACTIVITIES: CPT | Performed by: PHYSICAL THERAPIST

## 2024-06-20 NOTE — PROGRESS NOTES
"Daily Note     Today's date: 2024  Patient name: Geneva Lozada  : 1935  MRN: [id number]  Referring provider: Rasta Gaston MD  Dx:   Encounter Diagnosis     ICD-10-CM    1. Urinary urgency  R39.15       2. Myofascial pain dysfunction syndrome  M79.18       3. Pelvic pain  R10.2           Start Time: 1200  Stop Time: 1255  Total time in clinic (min): 55 minutes    Subjective: Pt reports she is pretty good but is having more problems with urinary urgency. Last night she was trying to hold to back her pee with her muscles by doing pelvic floor contractions and not releasing her muscles at all. PT explained to pt the strategy calls for contraction with focus on relaxation of her PFM which she reports she is not doing. She reports she is trying to make her pelvic muscles stronger but it is making it worse.     Objective: See treatment diary below      Assessment:  PT cued pt on importance of PFM relaxation to manage her symptoms as her symptoms ar associated with PFM overactivity. PT cued pt on continuing with FBS but utilizing heel raises instead of PFM contractions. Pt reports she is not utilizing prescribed voiding mechanics with feet elevation or leaning forward with a straight back. She reports she sits on the toilet sometimes and can't go and then when she gets off the toilet thenshe will lose her urine or she makes a dash for the bathroom and then se might make it. She reports she gets urgency and she will wait but by the time she gets the 3rd urge she has to rush to the bathroom \"when you get urgency you have to run.\"  PT explained to pt this will heighten the urge and an cause leakage. Pt reports she delays her urge until she has to run. PT cued pt on trying to delay only one time with primary goal of voiding at a low urge as discussed last visit.. During 360 deg breathing pt continually compensates with abdominal tightnening requring consistent cueing for abdominal softening and expansion. Pt did " not want to participate in internal manual therapy previously because she was dealing with an infection but is interested in resuming that now as well as self internal stretches to relax PFM. PT performed internal manual terapy with pt consent. Pt declined second person in the room. Pt presented with PFM tension with gentle touch.PT cued pt on importance of avoiding increase in pain. If pts symptoms worsened above 3-4/10, PT removed pressure or if symptoms worsened form baseline, PT removed pressure. PT held stretches for 15-20 secs so as not to provoke muscle spasm. PT also performed external perineal STM with symptoms greatest on L side. PT cued pt on self external desensitization with gentle small circles in area between vaginal and anal opening. PT also cued pt on pelvic drops to lengthen PFM to be performed daily and pt demo ability to do this exercise and was issued handout. PT re-issued self internal manual therapy handout for 1st layer muscles and pt provided verbal understanding of this. PT cued pt on improtance of light pressure without worsening of symptoms. PT cued pt to continue with stretches and PFM relaxation however hold PT strengthening exercises until next visit.  Tolerated treatment well. Patient would benefit from continued PT      Plan: Continue per plan of care.  Progress note during next visit.      Precautions: FALL RISK, bladder stimulant, hx of POLLO, hx of bladder lift surgergy  Impairments/functional limitations: urinary urgency, urinary incontinence, vaginal pain, R>L hip weakness, OI/post pelvic floor  tenderness, adductor tenderness, linea alba tenderness, poor load transfer  Daily Treatment Diary    Manuals 5/3 5/10 5/17 5/24 5/31 6/7    External assess performed         Internal assess performed         Internal manual therapy STM/TPR Layer 1: 3-6, 9-6 oclock      Performed Layer 1 3-6, 9-6 o'clock, external perineum   Objective assessment nv performed        OI/post pelvic floor STM   " * 8 mins  Supine, ischiorectal fossa, b/l 6 mins    Adductor STM/skin rolling    10 mins    8 mins b/l    Neuro Re-Ed          TAC   Review, 10\"x10 5'x5      PF     Review form, QF x5     TAC+PF          TAC with march    X10 ea  HEP    TAC with alt knee fall out    X10 ea  HEP    TAC with ball squeeze    5x10 ea  HEP    TAC with SLR          TAC with s/l ABD          TAC with bridge    3\"x10   HEP    TAC with clamshells                     360 deg breathing * Review, supine, prone, seated Review, seated, during eercises  review     Ther Ex          Bike    5 mins L1    5 mins L1               Supine piri stretch   30\"x2 ea HEP      Supine fig 4 stretch   30\"x2 ea, painfree range HEP      Supine butterfly stretch unilateral  1\"x1 ea 30'x1 ea with pillow HEP      Seated hamstring stretch   30\"x2 ea HEP      Seated happy baby with 360 deg breathing   5''x5  HEP      Seated itz pose  with 360 deg breath   1 min HEP      Standing HR      X10     Standing hip ABD      X10 ea    Standing march      X10 ea    Side stepping      2 laps x5 steps ea way at bar    Ther Activity          Self internal STM   Review, issued handout, Layer 1 3-6, 9-6 o'clock Review, hold      review   Sitting with cushion    Review      Voiding mechanics    Review, position, feet elevated    review   Healthy bladder habits     NV Review, space out fluid intake, minimize void JIC      Urinary urgency     NV Review,  review review   Bladder Diary     Issued, review review     Urge delay:QF          Urge delay: HR       Review, issued   Freeze, breathe, squeeze       Review, void at low urge review   The knack           Sitting posture: feet hip width apart review         Gait Training                              Modalities                                                  "

## 2024-07-01 ENCOUNTER — APPOINTMENT (OUTPATIENT)
Dept: PHYSICAL THERAPY | Facility: REHABILITATION | Age: 89
End: 2024-07-01
Payer: MEDICARE

## 2024-07-02 ENCOUNTER — EVALUATION (OUTPATIENT)
Dept: PHYSICAL THERAPY | Facility: REHABILITATION | Age: 89
End: 2024-07-02
Payer: MEDICARE

## 2024-07-02 DIAGNOSIS — R10.2 PELVIC PAIN: ICD-10-CM

## 2024-07-02 DIAGNOSIS — R39.15 URINARY URGENCY: Primary | ICD-10-CM

## 2024-07-02 DIAGNOSIS — M79.18 MYOFASCIAL PAIN DYSFUNCTION SYNDROME: ICD-10-CM

## 2024-07-02 PROCEDURE — 97110 THERAPEUTIC EXERCISES: CPT | Performed by: PHYSICAL THERAPIST

## 2024-07-02 PROCEDURE — 97140 MANUAL THERAPY 1/> REGIONS: CPT | Performed by: PHYSICAL THERAPIST

## 2024-07-02 NOTE — PROGRESS NOTES
PT Re-Evaluation     Today's date: 2024  Patient name: Geneva Lozada  : 1935  MRN: [id number]  Referring provider: Rasta Gaston MD  Dx:   Encounter Diagnosis     ICD-10-CM    1. Urinary urgency  R39.15       2. Myofascial pain dysfunction syndrome  M79.18       3. Pelvic pain  R10.2           Start Time: 1215  Stop Time: 1313  Total time in clinic (min): 58 minutes    Assessment  Impairments: abnormal muscle tone, impaired physical strength, lacks appropriate home exercise program and pain with function    Assessment details: Patient is a 88 y.o. female who presents to physical therapy with diagnosis of myofascial pain dysfunction.Pt presents with decreased pain intensity, improved urinary urgency control most of the time, improved strength in b/l LE. Pt still remains with b/l hip ABD weakness, (+) CHRISTA, impaired load transfer and urinary incontinence daily. Pt is responding positively to self internal STM and use of urge deferral strategy however would benefit from continued skilled PT to improve proximal hip strength to offload PFM, continue to decrease pain and urinary urge incontinence and be able to effectively use bladder stimulator. Pt is in agreement with this POC.    Goals  Short term goals:  Pt will be able to delay urge at least 5 minutes in 6 weeks to decrease fall risk and decrease incontinence - MOSTLY MET  Pt will demonstrate good understanding of urge delay techniques in 6 weeks - met  Pt will extend intervoid period to 2 hours or greater in 6 weeks. - MOSTLY MET    Long term goals:-   Pt will be compliant with HEP by discharge. - NOT MET  Pt will be able to delay urge for greater than 10 minutes by discharge decrease fall risk and decrease incontinence - NOT MET   Pt will dec nighttime voiding to 1-2 times a night to decrease fall risk and demonstrated improved urgency control - PARTIALLY MET   Pt will report <3 instances of leakage per month. - NOT MET     Updated goals:   Pt will  "present with pelvic pain 1-2/10 at worst    Plan  Patient would benefit from: skilled physical therapy  Planned modality interventions: biofeedback, cryotherapy and thermotherapy: hydrocollator packs    Planned therapy interventions: joint mobilization, manual therapy, strengthening, stretching, therapeutic activities, therapeutic exercise, functional ROM exercises, flexibility, home exercise program, abdominal trunk stabilization and balance    Frequency: 1-2x week  Treatment plan discussed with: patient  Plan details: Cont per POC 1-2x/week for 4-8 weeks    PT Pelvic Floor Subjective:   History of Present Illness:   Pt has been consistent with her HEP including internal stretches most days of the week and reports \"it is going good.\"  She feels her pain is better since she started her internal stretches.     She tries her HR to delay her urge and it works for a bit, about 3-4 mins, but then the urge comes back and she has to make a dash to the bathroom. She has had a few instances of when she lost her urine on her way tot he bathroom but she also had some instances when she could pee at a low urge.     Pt has had incontinence 2 times a day when she dashes to the bathroom but the rest of her voids she is making it without leakage.     She is not exactly sure when she should try to void and when she should keep delaying. PT cued pt to focus on voiding kat low urge consistently with goal of progressing to increasing intervoid interval.     She is working on relaxing her PFM and doing her stretches and pelvic drops and repots it is not gone but it is better.   She rates her pelvic pain 2-3/10.   Still feels like she is getting improved abdominal strength.    Pt will have urgency and she will sit to pee and it won't come out. She will stand up to get dressed and then she will leak as she is walking out of the bathroom.   PT asked pt to describe what she does when she gets this sensation and she reports \"I pull in my " abdomen muscles and then try to push it out. PT explained this will cause her to suppress bladder contractions and instead the focus should be on deep breathing and PFM relaxation. PT has reviewed this with pt however she reports it is a lot to remember. PT reviewed handout and issued it this session    Pt reports 80-85% improvement with PT    She walks with SPC. Had a recent fall prior to PT, went to ER, no fractures.     At end of session pt informed PT she fell this morning when she was standing and reaching for her cane. She reports she did notinjure herself and did not call her physician.      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    Stress severity: pt's kids would like her to move and give up her home and upset because she is falling.    History of Depression: no  Diet and Exercise:    Diet:balanced nutrition    Bike daily  Chair exercises   OB/ gyn History    Gestational History:     Prior Pregnancy: Yes      Number of prior pregnancies: 3    Number of term pregnancies: 3    Delivery Type: vaginal delivery      Menstrual History:      Menopausal: menopause  Bladder Function:     Voiding Difficulties positive for: urgency, frequent urination, hesitancy and straining      Voiding Difficulties negative for: incomplete emptying       Voiding Difficulties comments:     Voiding frequency: every 1-2 hours    Urinary leakage: urine leakage    Urinary leakage aggravated by: walking to the bathroom and post-void dribble    Urinary leakage not aggravated by: coughing, sneezing, hearing running water, key-in-the-door syndrome and laughing    Nocturia (episodes per night): 2    Painful urination: No      Fluid Intake Type:  Tea, water and coffee    Intake (ounces):     Intake (ounces) comment: Plain water: 6-8 cups of water a day  3 cups of decaf coffee throughout the day  1 glass of diet iced tea a day Daily: 2Weekly: 2  Incontinence  "Management:     Pads/Diaper Use:  Day    Pads/Diapers Additional Comments: liner  Bowel Function:     Voiding DIfficulties: constipation      Bowel Function comments:  Takes a laxative     Bowel frequency: daily    Uses \"squatty potty\": no Squatty Potty  Sexual Function:     Sexually Active:  Not sexually active  Patient Goals:     Other patient goals:  Use my bladder stimulator without pain - PROGRESSING      Objective    Pt provided verbal consent prior to and throughout objective assessment     Posture: thoracic kyphosis    Tenderness:  Piriformis: + b/l  Obturator Internus: neg b/l  Post pelvic floor: +L  Adductors: neg, tightness noted L>R   Tenderness in linea alba: distal 1/3rd     Lumbar AROM   Max ext limitation  No pain with lumbar flex or ext     SLS  L: poor load transfer  R: fair load transfer     Other Comments:  Good TAC  Fair PFM, initially performs bearing down, improved with cueing for \"sucking in\" motion      MMT:   Left Right   Hip flex 4 4   Hip ABD 3- 3-   Hip ext nt nt   Hip IR 4 4   Hip ER 4- 4   Knee flex 4 4+   Knee ext 4+ 4+   Ankle DF 4+ 4+               Special Tests:   Left Right   CHRISTA +, pain since fall -   FADIR - -   Post Thigh Thrust - -        Pop angle  43 deg 41 deg   Hip IR PROM 37 deg 29 deg   Hip ER PROM 38 deg, pain  47 deg       Access Code: [email or web address]  URL: [email or web address]  Date: 07/02/2024  Prepared by: Sahara Swann    Exercises  - Supine Diaphragmatic Breathing  - 3-5 x daily - 5 reps  - Supine Butterfly Groin Stretch  - 2 x daily - 1 reps - 30-60 secs hold  - Supine Piriformis Stretch with Foot on Ground  - 2 x daily - 2 reps - 30 secs hold  - Supine Figure 4 Piriformis Stretch  - 2 x daily - 2 reps - 30 secs hold  - Pelvic Floor Relaxation Supported Sitting in Chair  - 2 x daily - 1 min hold  - Seated Happy Baby With Trunk Flexion For Pelvic Relaxation  - 2 x daily - 5-10 reps - 5 secs hold  - Seated Hamstring Stretch  - 2 x daily - 2 reps - 30 secs " "hold  - Supine Bridge  - 3 x weekly - 5-10 reps - 2-3 secs hold  - Small Range Straight Leg Raise  - 3 x weekly - 5-10 reps  - Clamshell  - 3 x weekly - 5-10 reps - 3 secss hold  - Sidelying Hip Abduction  - 3 x weekly - 5-10 reps        Precautions: FALL RISK, bladder stimulant, hx of POLLO, hx of bladder lift strategy  Impairments/functional limitations: urinary urgency, urinary incontinence, vaginal pain  Daily Treatment Diary    Manuals 7/2 6/7    External assess          Internal assess          Internal manual therapy STM/TPR       Performed Layer 1 3-6, 9-6 o'clock, external perineum   Objective assessment          OI/post pelvic floor STM      Supine, ischiorectal fossa, b/l 6 mins    Adductor STM/skin rolling       8 mins b/l    Re-eval 40 mins         Neuro Re-Ed          TAC          PF          TAC+PF          TAC with SLR X5 ea         TAC with s/l ABD X5 ea         TAC with bridge 3\"x5     HEP    TAC with clamshells  3\"x5 ea                   360 deg breathing review         Ther Ex          Bike       5 mins L1               Supine piri stretch review         Supine fig 4 stretch review         Supine butterfly stretch unilateral review         Seated hamstring stretch review         Seated happy baby with 360 deg breathing review         Seated itz pose  with 360 deg breath review         Standing HR hold     X10     Standing hip ABD hold     X10 ea    Standing march hold     X10 ea    Side stepping hold     2 laps x5 steps ea way at bar    Ther Activity          Urinary hesitancy and double void Review, issued handout         Self internal STM   review      review   Sitting with cushion          Voiding mechanics       review   Healthy bladder habits           Urinary urgency       review review   Urge delay:QF          Urge delay: HR       Review, issued   Freeze, breathe, squeeze  Review, focus on voiding at low urge      Review, void at low urge review   The knack           Sitting posture: " feet hip width apart          Gait Training                              Modalities

## 2024-07-08 ENCOUNTER — APPOINTMENT (OUTPATIENT)
Dept: PHYSICAL THERAPY | Facility: REHABILITATION | Age: 89
End: 2024-07-08
Payer: MEDICARE

## 2024-07-09 ENCOUNTER — HOSPITAL ENCOUNTER (OUTPATIENT)
Dept: MAMMOGRAPHY | Facility: MEDICAL CENTER | Age: 89
Discharge: HOME | End: 2024-07-09
Payer: MEDICARE

## 2024-07-09 VITALS — HEIGHT: 65 IN | WEIGHT: 110 LBS | BODY MASS INDEX: 18.33 KG/M2

## 2024-07-09 DIAGNOSIS — Z12.31 ENCOUNTER FOR SCREENING MAMMOGRAM FOR MALIGNANT NEOPLASM OF BREAST: ICD-10-CM

## 2024-07-09 PROCEDURE — 77067 SCR MAMMO BI INCL CAD: CPT

## 2024-07-09 PROCEDURE — 77063 BREAST TOMOSYNTHESIS BI: CPT

## 2024-07-24 ENCOUNTER — APPOINTMENT (OUTPATIENT)
Dept: PHYSICAL THERAPY | Facility: REHABILITATION | Age: 89
End: 2024-07-24
Payer: MEDICARE

## 2024-07-31 ENCOUNTER — OFFICE VISIT (OUTPATIENT)
Dept: PHYSICAL THERAPY | Facility: REHABILITATION | Age: 89
End: 2024-07-31
Payer: MEDICARE

## 2024-07-31 DIAGNOSIS — R39.15 URINARY URGENCY: Primary | ICD-10-CM

## 2024-07-31 DIAGNOSIS — R10.2 PELVIC PAIN: ICD-10-CM

## 2024-07-31 DIAGNOSIS — M79.18 MYOFASCIAL PAIN DYSFUNCTION SYNDROME: ICD-10-CM

## 2024-07-31 PROCEDURE — 97110 THERAPEUTIC EXERCISES: CPT | Performed by: PHYSICAL THERAPIST

## 2024-07-31 PROCEDURE — 97530 THERAPEUTIC ACTIVITIES: CPT | Performed by: PHYSICAL THERAPIST

## 2024-07-31 NOTE — PROGRESS NOTES
Daily Note     Today's date: 2024  Patient name: Geneva Lozada  : 1935  MRN: [id number]  Referring provider: Rasta Gaston MD  Dx:   Encounter Diagnosis     ICD-10-CM    1. Urinary urgency  R39.15       2. Pelvic pain  R10.2       3. Myofascial pain dysfunction syndrome  M79.18           Start Time: 1033  Stop Time: 1130  Total time in clinic (min): 57 minutes    Subjective: Pt reports she has time when she does not realize she does not need to go to the bathroom and she stand sup and her urine is coming out of her. She is not getting an urge but she is dribbling pee. This occurs when she goes to stand form a chair and it is not every time. She reports her urgency is better and she is not having leakage often related to that and she is able to use her urge deferral strategy. Pt is doing her internal stretches every other day and she is not having any pain and feels her muscles more relaxed. She still has pelvic pressure in her vaginal area when sitting a hard surface but if she sits on a cushioned surface it is improved. She sits on firmer surfaces when eating meals and when dressing.      Objective: See treatment diary below      Assessment: Pt cued pt on nature of stress incontinence which may be cause of dribbling urine when standing up from a chair. PT cued pt on exhaling during exertion especially with sit to stand to manage IAP and decrease downward strain on pelvic floor to manage incontinence. Pt also cued pt to shift weight forward in chair and utilize nose over toes to improve ease of transfer with sit to stand to also assist in managaing incontinence. Pt able to recall this and demo independence 50% of the time in the session without cueing by PT. Tolerated treatment well. Patient would benefit from continued PT      Plan: Continue per plan of care.  Assess response to cushion, exercises, and sit to stand transfers.      Precautions: FALL RISK, bladder stimulant, hx of POLLO, hx of bladder  "lift strategy  Impairments/functional limitations: urinary urgency, urinary incontinence, vaginal pain  Daily Treatment Diary    Manuals 7/2 7/31 6/7    External assess          Internal assess          Internal manual therapy STM/TPR       Performed Layer 1 3-6, 9-6 o'clock, external perineum   Objective assessment          OI/post pelvic floor STM  Supine, ischiorectal fossa, b/l 8 mins    Supine, ischiorectal fossa, b/l 6 mins    Adductor STM/skin rolling       8 mins b/l    Re-eval 40 mins           Neuro Re-Ed          TAC          PF          TAC+PF          TAC with SLR X5 ea HEP        TAC with s/l ABD X5 ea HEP        TAC with bridge 3\"x5 HEP    HEP    TAC with clamshells  3\"x5 ea HEP                  SLS   nv        3 way tap  nv        nv          360 deg breathing review         Ther Ex          Bike       5 mins L1               Supine piri stretch review         Supine fig 4 stretch review         Supine butterfly stretch unilateral review         Seated hamstring stretch review         Seated happy baby with 360 deg breathing review         Seated itz pose  with 360 deg breath review         Standing HR hold X10 ea    X10     Standing hip ABD hold X10 ea    X10 ea    Standing march hold X10 ea    X10 ea    Side stepping hold     2 laps x5 steps ea way at bar    Repeat sit to stand   X10 ea                   FSU with march and rail                     Ther Activity          Urinary hesitancy and double void Review, issued handout         Self internal STM   review Review, continue frequency     review   Sitting with cushion  Review, for hard surfaces         Voiding mechanics       review   Healthy bladder habits           Urinary urgency       review review   Urge delay:QF          Urge delay: HR       Review, issued   Freeze, breathe, squeeze  Review, focus on voiding at low urge      Review, void at low urge review   Managing IAP/exhale on exhalation  Review, sit to stand transfers        The " rekha           Sitting posture: feet hip width apart          Gait Training                              Modalities

## 2024-08-02 ENCOUNTER — APPOINTMENT (OUTPATIENT)
Dept: PHYSICAL THERAPY | Facility: REHABILITATION | Age: 89
End: 2024-08-02
Payer: MEDICARE

## 2024-08-09 ENCOUNTER — OFFICE VISIT (OUTPATIENT)
Dept: PHYSICAL THERAPY | Facility: REHABILITATION | Age: 89
End: 2024-08-09
Payer: MEDICARE

## 2024-08-09 DIAGNOSIS — M79.18 MYOFASCIAL PAIN DYSFUNCTION SYNDROME: ICD-10-CM

## 2024-08-09 DIAGNOSIS — R39.15 URINARY URGENCY: Primary | ICD-10-CM

## 2024-08-09 DIAGNOSIS — R10.2 PELVIC PAIN: ICD-10-CM

## 2024-08-09 PROCEDURE — 97140 MANUAL THERAPY 1/> REGIONS: CPT | Performed by: PHYSICAL THERAPIST

## 2024-08-09 PROCEDURE — 97110 THERAPEUTIC EXERCISES: CPT | Performed by: PHYSICAL THERAPIST

## 2024-08-09 PROCEDURE — 97112 NEUROMUSCULAR REEDUCATION: CPT | Performed by: PHYSICAL THERAPIST

## 2024-08-09 NOTE — PROGRESS NOTES
Daily Note     Today's date: 2024  Patient name: Geneva Lozada  : 1935  MRN: [id number]  Referring provider: Rasta Gaston MD  Dx:   Encounter Diagnosis     ICD-10-CM    1. Urinary urgency  R39.15       2. Pelvic pain  R10.2       3. Myofascial pain dysfunction syndrome  M79.18           Start Time: 1000  Stop Time: 1057  Total time in clinic (min): 57 minutes    Subjective: Pt has been sitting with cushion now on harder surfaces and it has helped. Her pain has been pretty good, not as frequent or intense. Pain at worst rated 5-6/10. Pt will experience the pain in sitting, better with the cushion. Pt reports her standing exercises are going well. Pt reports her peeing is improving as well. She has not had intense urgency and is more controlled now. She has not had leakage. Pt reports she is doing her internal stretches and reports everything seems relaxed. Pt has a urology appt on 9/3/24.      Objective: See treatment diary below      Assessment: PT progressed POC in standing with dynamic hip strengthening and stability exercises using counter for support as needed. Pt cont to require cueing for sit to stand to shift weight forward in the chair prior to standing and utilize nose over toes to control descent and assist ascent. At end of session, pt experienced pain when sitting in chair in clinic. PT modified seated position with a foot stool to elevate knees slightly above her hips which alleviated her pain. PT rec pt try this at home in her dining chair, suing a foot stool to decrease her overall pain. Pt verbalized understanding of this. Pt still remains with PFM tension in L post plevic floor with MT through ischiorectal fossa externally, dec with manual therapy. Tolerated treatment well. Patient would benefit from continued PT      Plan: Continue per plan of care.  Potential discharge next visit.     Precautions: FALL RISK, bladder stimulant, hx of POLLO, hx of bladder lift  "strategy  Impairments/functional limitations: urinary urgency, urinary incontinence, vaginal pain  Daily Treatment Diary    Manuals 7/2 7/31 8/9       External assess          Internal assess          Internal manual therapy STM/TPR          Objective assessment          OI/post pelvic floor STM  Supine, ischiorectal fossa, b/l 8 mins Supine, ischiorectal fossa, L 8 mins       Adductor STM/skin rolling           Re-eval 40 mins           Neuro Re-Ed          TAC          PF          TAC+PF          TAC with SLR X5 ea HEP        TAC with s/l ABD X5 ea HEP        TAC with bridge 3\"x5 HEP        TAC with clamshells  3\"x5 ea HEP                  SLS with counter support  nv 30\"x2 ea, hips level       3 way tap with counter support  nv X 5 ea                 360 deg breathing review         Ther Ex          Bike    10 mins                  Supine piri stretch review         Supine fig 4 stretch review         Supine butterfly stretch unilateral review         Seated hamstring stretch review         Seated happy baby with 360 deg breathing review  5\"x5        Seated itz pose  with 360 deg breath review         Standing HR hold X10 ea HEP       Standing hip ABD hold X10 ea HEP       Standing march hold X10 ea HEP       Side stepping hold  HEP       Repeat sit to stand   X10 ea  X10 ea                 FSU with march and rail    X10 ea                 Ther Activity          Urinary hesitancy and double void Review, issued handout         Self internal STM   review Review, continue frequency        Sitting with cushion  Review, for hard surfaces  review       Sitting with foot stool with hips higher than knees    Review, seated posture       Voiding mechanics          Healthy bladder habits           Urinary urgency           Urge delay:QF          Urge delay: HR          Freeze, breathe, squeeze  Review, focus on voiding at low urge          Managing IAP/exhale on exhalation  Review, sit to stand transfers        The " rekha           Sitting posture: feet hip width apart          Gait Training                              Modalities

## 2024-08-16 ENCOUNTER — EVALUATION (OUTPATIENT)
Dept: PHYSICAL THERAPY | Facility: REHABILITATION | Age: 89
End: 2024-08-16
Payer: MEDICARE

## 2024-08-16 DIAGNOSIS — R10.2 PELVIC PAIN: ICD-10-CM

## 2024-08-16 DIAGNOSIS — R39.15 URINARY URGENCY: Primary | ICD-10-CM

## 2024-08-16 DIAGNOSIS — M79.18 MYOFASCIAL PAIN DYSFUNCTION SYNDROME: ICD-10-CM

## 2024-08-16 PROCEDURE — 97530 THERAPEUTIC ACTIVITIES: CPT | Performed by: PHYSICAL THERAPIST

## 2024-08-16 PROCEDURE — 97110 THERAPEUTIC EXERCISES: CPT | Performed by: PHYSICAL THERAPIST

## 2024-08-16 PROCEDURE — 97140 MANUAL THERAPY 1/> REGIONS: CPT | Performed by: PHYSICAL THERAPIST

## 2024-08-16 NOTE — PROGRESS NOTES
PT Re-Evaluation  and PT Discharge    Today's date: 2024  Patient name: Geneva Lozada  : 1935  MRN: [id number]  Referring provider: Rasta Gaston MD  Dx:   Encounter Diagnosis     ICD-10-CM    1. Urinary urgency  R39.15       2. Pelvic pain  R10.2       3. Myofascial pain dysfunction syndrome  M79.18           Start Time: 1003  Stop Time: 1047  Total time in clinic (min): 44 minutes    Assessment  Impairments: abnormal muscle tone, impaired physical strength, lacks appropriate home exercise program and pain with function    Assessment details: Pt presents overall improved function, verbalizes independence with HEP and is appropriate for d/c from skilled PT to manage symptoms independently and progress towards meeting LTG's. Pt is in agreement with this POC. Pt declines review of HEP this session.      Goals  Short term goals  Pt will be able to delay urge at least 5 minutes in 6 weeks to decrease fall risk and decrease incontinence - MET  Pt will demonstrate good understanding of urge delay techniques in 6 weeks - met  Pt will extend intervoid period to 2 hours or greater in 6 weeks. - MOSTLY MET, 1.5-2 hours    Long term goals:-   Pt will be compliant with HEP by discharge. - MET  Pt will be able to delay urge for greater than 10 minutes by discharge decrease fall risk and decrease incontinence -  MET   Pt will dec nighttime voiding to 1-2 times a night to decrease fall risk and demonstrated improved urgency control - MET   Pt will report <3 instances of leakage per month. - MET    Updated goals:   Pt will present with pelvic pain 1-2/10 at worst - NOT MET, 5-6/10 at worst, however less frequency and decreased duration of about 20 mins    Plan    Planned therapy interventions: home exercise program    Plan details: Pt d/c'ed from skilled PT.       PT Pelvic Floor Subjective:   History of Present Illness:   Pt reports 75% improvement with PT    Pt reports her urgency is much better and controllable,  She has urgency still but she can control it to go to the bathroom using her urge deferral strategy.   She has less PFM tension/pain now. She notices it when sitting and she has utilized a cushion and a foot stool which is helping.   Pt has not had incontinence recently  Pt reports pain in the past few days is rated a 5/10.   Pt is still doing her internal stretches 1x/day. As symptoms continue to improve, after 1 more month and following up with urogyn, pt can dec frequency to every other day as long as symptoms don't regress for at least 2 weeks. Then can continue to dec frequency by 1 day less every 2 weeks as long as symptoms don't regress. If regression occurs, will inc frequency to previous amount. Pt verbalizes understanding of this.   Pt has made an appt with urogyn to discuss bladder stimulator.    She walks with SPC. Had a recent fall prior to PT, went to ER, no fractures.     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    Stress severity: pt's kids would like her to move and give up her home and upset because she is falling.    History of Depression: no  Diet and Exercise:    Diet:balanced nutrition    Bike daily  Chair exercises   OB/ gyn History    Gestational History:     Prior Pregnancy: Yes      Number of prior pregnancies: 3    Number of term pregnancies: 3    Delivery Type: vaginal delivery      Menstrual History:      Menopausal: menopause  Bladder Function:     Voiding Difficulties positive for: urgency and hesitancy (if shesits with good posture it is easy, she does not need to press on her stomach anymore)      Voiding Difficulties negative for: frequent urination, straining and incomplete emptying       Voiding Difficulties comments:     Voiding frequency: every 1-2 hours    Urinary leakage: no urine leakage    Urinary leakage not aggravated by: coughing, sneezing, walking to the bathroom, hearing running water,  "key-in-the-door syndrome, post-void dribble and laughing    Nocturia (episodes per night): 2 and 1    Painful urination: No      Fluid Intake Type:  Tea, water and coffee    Intake (ounces):     Intake (ounces) comment: Plain water: 6-8 cups of water a day  3 cups of decaf coffee throughout the day  1 glass of diet iced tea a day Daily: 2Weekly: 2  Incontinence Management:     Pads/Diaper Use:  Day    Pads/Diapers Additional Comments: linerWears liners as a precaution but no leakage anymore  :  Bowel Function:     Bowel Function comments:  Takes a laxative     Bowel frequency: daily    Uses \"squatty potty\": no Squatty Potty  Sexual Function:     Sexually Active:  Not sexually active  Patient Goals:     Other patient goals:  Use my bladder stimulator without pain - UNABLE TO ASSESS      Objective    Pt provided verbal consent prior to and throughout objective assessment     Posture: thoracic kyphosis    Tenderness:  Piriformis: + L  Obturator Internus: neg b/l  Post pelvic floor: neg b/l  Adductors: tightness noted L>R   Tenderness in linea alba: neg    Lumbar AROM   Max ext limitation  No pain with lumbar flex or ext     SLS  L: good load transfer  R: good load transfer     Other Comments:  Good TAC  Good PFM      MMT:   Left Right   Hip flex 4 4   Hip ABD 3+ 3+   Hip ext nt nt   Hip IR 4+ 4+   Hip ER 4 4   Knee flex 4+ 4+   Knee ext 4+ 4+   Ankle DF 4+ 4+               Special Tests:   Left Right   CHRISTA +, pain since fall -   FADIR + -   Post Thigh Thrust - -        Pop angle  43 deg 43 deg   Hip IR PROM 45 deg 37 deg   Hip ER PROM 40 deg, pain  47 deg          Precautions: FALL RISK, bladder stimulant, hx of POLLO, hx of bladder lift strategy  Impairments/functional limitations: urinary urgency, urinary incontinence, vaginal pain  Daily Treatment Diary    Manuals 7/2 7/31 8/9 8/16      External assess          Internal assess          Internal manual therapy STM/TPR          Objective assessment          OI/post " "pelvic floor STM  Supine, ischiorectal fossa, b/l 8 mins Supine, ischiorectal fossa, L 8 mins       Adductor STM/skin rolling           Re-eval 40 mins   20 mins        Neuro Re-Ed          TAC          PF          TAC+PF          TAC with SLR X5 ea HEP        TAC with s/l ABD X5 ea HEP        TAC with bridge 3\"x5 HEP        TAC with clamshells  3\"x5 ea HEP                  SLS with counter support  nv 30\"x2 ea, hips level       3 way tap with counter support  nv X 5 ea                 360 deg breathing review         Ther Ex          Bike    10 mins  10 mins L1                Supine piri stretch review         Supine fig 4 stretch review         Supine butterfly stretch unilateral review         Seated hamstring stretch review         Seated happy baby with 360 deg breathing review  5\"x5        Seated itz pose  with 360 deg breath review         Standing HR hold X10 ea HEP       Standing hip ABD hold X10 ea HEP       Standing march hold X10 ea HEP       Side stepping hold  HEP       Repeat sit to stand   X10 ea  X10 ea                 FSU with march and rail    X10 ea                 Ther Activity          Urinary hesitancy and double void Review, issued handout         Self internal STM   review Review, continue frequency  Review, how to dec frequency       Sitting with cushion  Review, for hard surfaces  review       Sitting with foot stool with hips higher than knees    Review, seated posture       Voiding mechanics          Healthy bladder habits           Urinary urgency           Urge delay:QF          Urge delay: HR          Freeze, breathe, squeeze  Review, focus on voiding at low urge          Managing IAP/exhale on exhalation  Review, sit to stand transfers        The knack           Sitting posture: feet hip width apart          Gait Training                              Modalities                                                         "

## 2025-01-23 ENCOUNTER — HOSPITAL ENCOUNTER (OUTPATIENT)
Dept: GASTROENTEROLOGY | Facility: HOSPITAL | Age: 89
Setting detail: OUTPATIENT SURGERY
End: 2025-01-23
Attending: INTERNAL MEDICINE
Payer: MEDICARE

## 2025-01-23 ENCOUNTER — ANESTHESIA (OUTPATIENT)
Dept: GASTROENTEROLOGY | Facility: HOSPITAL | Age: 89
End: 2025-01-23
Payer: MEDICARE

## 2025-01-23 ENCOUNTER — ANESTHESIA EVENT (OUTPATIENT)
Dept: GASTROENTEROLOGY | Facility: HOSPITAL | Age: 89
End: 2025-01-23
Payer: MEDICARE

## 2025-01-23 VITALS
DIASTOLIC BLOOD PRESSURE: 67 MMHG | HEIGHT: 65 IN | BODY MASS INDEX: 18.33 KG/M2 | SYSTOLIC BLOOD PRESSURE: 113 MMHG | RESPIRATION RATE: 15 BRPM | OXYGEN SATURATION: 99 % | TEMPERATURE: 98 F | HEART RATE: 77 BPM | WEIGHT: 110 LBS

## 2025-01-23 DIAGNOSIS — C16.1 MALIGNANT NEOPLASM OF FUNDUS OF STOMACH (HCC): ICD-10-CM

## 2025-01-23 PROCEDURE — 88341 IMHCHEM/IMCYTCHM EA ADD ANTB: CPT | Performed by: PATHOLOGY

## 2025-01-23 PROCEDURE — 88305 TISSUE EXAM BY PATHOLOGIST: CPT | Performed by: PATHOLOGY

## 2025-01-23 PROCEDURE — 88313 SPECIAL STAINS GROUP 2: CPT | Performed by: PATHOLOGY

## 2025-01-23 PROCEDURE — 88342 IMHCHEM/IMCYTCHM 1ST ANTB: CPT | Performed by: PATHOLOGY

## 2025-01-23 RX ORDER — SODIUM CHLORIDE, SODIUM LACTATE, POTASSIUM CHLORIDE, CALCIUM CHLORIDE 600; 310; 30; 20 MG/100ML; MG/100ML; MG/100ML; MG/100ML
INJECTION, SOLUTION INTRAVENOUS CONTINUOUS PRN
Status: DISCONTINUED | OUTPATIENT
Start: 2025-01-23 | End: 2025-01-23

## 2025-01-23 RX ORDER — ROMOSOZUMAB-AQQG 105 MG/1.17ML
210 INJECTION, SOLUTION SUBCUTANEOUS
COMMUNITY

## 2025-01-23 RX ORDER — LIDOCAINE HYDROCHLORIDE 10 MG/ML
INJECTION, SOLUTION EPIDURAL; INFILTRATION; INTRACAUDAL; PERINEURAL AS NEEDED
Status: DISCONTINUED | OUTPATIENT
Start: 2025-01-23 | End: 2025-01-23

## 2025-01-23 RX ORDER — FLUCONAZOLE 100 MG/1
100 TABLET ORAL DAILY
Qty: 3 TABLET | Refills: 1 | Status: SHIPPED | OUTPATIENT
Start: 2025-01-23 | End: 2025-01-26

## 2025-01-23 RX ORDER — SODIUM CHLORIDE, SODIUM LACTATE, POTASSIUM CHLORIDE, CALCIUM CHLORIDE 600; 310; 30; 20 MG/100ML; MG/100ML; MG/100ML; MG/100ML
125 INJECTION, SOLUTION INTRAVENOUS CONTINUOUS
Status: DISPENSED | OUTPATIENT
Start: 2025-01-23

## 2025-01-23 RX ORDER — PROPOFOL 10 MG/ML
INJECTION, EMULSION INTRAVENOUS AS NEEDED
Status: DISCONTINUED | OUTPATIENT
Start: 2025-01-23 | End: 2025-01-23

## 2025-01-23 RX ADMIN — SODIUM CHLORIDE, SODIUM LACTATE, POTASSIUM CHLORIDE, AND CALCIUM CHLORIDE 125 ML/HR: .6; .31; .03; .02 INJECTION, SOLUTION INTRAVENOUS at 08:31

## 2025-01-23 RX ADMIN — SODIUM CHLORIDE, SODIUM LACTATE, POTASSIUM CHLORIDE, AND CALCIUM CHLORIDE: .6; .31; .03; .02 INJECTION, SOLUTION INTRAVENOUS at 08:31

## 2025-01-23 RX ADMIN — PROPOFOL 20 MG: 10 INJECTION, EMULSION INTRAVENOUS at 08:54

## 2025-01-23 RX ADMIN — LIDOCAINE HYDROCHLORIDE 50 MG: 10 SOLUTION INTRAVENOUS at 08:44

## 2025-01-23 RX ADMIN — PROPOFOL 20 MG: 10 INJECTION, EMULSION INTRAVENOUS at 08:52

## 2025-01-23 RX ADMIN — PROPOFOL 40 MG: 10 INJECTION, EMULSION INTRAVENOUS at 08:44

## 2025-01-23 RX ADMIN — PROPOFOL 30 MG: 10 INJECTION, EMULSION INTRAVENOUS at 08:46

## 2025-01-23 RX ADMIN — PROPOFOL 10 MG: 10 INJECTION, EMULSION INTRAVENOUS at 08:49

## 2025-01-23 NOTE — ANESTHESIA PREPROCEDURE EVALUATION
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 Wall thickness is   normal. Systolic function is normal with an ejection fraction of 60-65%.   Wall motion is within normal limits. There is normal diastolic function.   ·  Right Ventricle: Right ventricle cavity is normal. Systolic function is   normal.   · Left Atrium: Left atrium cavity size is normal.   ·  Aortic Valve: The aortic valve is trileaflet. The leaflets are mildly   thickened. The leaflets are mildly calcified. There is mild regurgitation.   There is no evidence of aortic valve stenosis.   ·  Mitral Valve: The leaflets are mildly thickened. There is trace   regurgitation. There is no evidence of mitral valve stenosis.   ·  IVC/SVC: The inferior vena cava demonstrates a diameter of <=21 mm and   collapses >50%; therefore, the right atrial pressure is estimated at 0-5   mmHg.   · Pulmonic Valve: PA pressure not calculated due to incomplete TR signal.     post-pubertal.      Anesthesia Plan  ASA Score- 3     Anesthesia Type- IV sedation with anesthesia with ASA Monitors.         Additional Monitors:     Airway Plan:            Plan Factors-Exercise tolerance (METS): >4 METS.    Chart reviewed. EKG reviewed. Imaging results reviewed. Existing labs reviewed. Patient summary reviewed.    Patient is not a current smoker.  Patient did not smoke on day of surgery.            Induction-  intravenous.    Postoperative Plan-     Perioperative Resuscitation Plan - Level 1 - Full Code.       Informed Consent- Anesthetic plan and risks discussed with patient.  I personally reviewed this patient with the CRNA. Discussed and agreed on the Anesthesia Plan with the CRNA..      NPO Status:  No vitals data found for the desired time range.

## 2025-01-23 NOTE — ANESTHESIA POSTPROCEDURE EVALUATION
Post-Op Assessment Note    CV Status:  Stable  Pain Score: 0    Pain management: adequate       Mental Status:  Alert and awake   Hydration Status:  Euvolemic   PONV Controlled:  Controlled   Airway Patency:  Patent     Post Op Vitals Reviewed: Yes    No anethesia notable event occurred.    Staff: CRNA       Last Filed PACU Vitals:  Vitals Value Taken Time   Temp 97.5 °F (36.4 °C) 01/23/25 0900   Pulse 74 01/23/25 0900   /79 01/23/25 0900   Resp 16 01/23/25 0900   SpO2 99 % 01/23/25 0900       Modified Maldonado:     Vitals Value Taken Time   Activity 2 01/23/25 0900   Respiration 2 01/23/25 0900   Circulation 2 01/23/25 0900   Consciousness 2 01/23/25 0900   Oxygen Saturation 2 01/23/25 0900     Modified Maldonado Score: 10

## 2025-01-23 NOTE — H&P
H&P - Gastroenterology   Name: Geneva Lozada 89 y.o. female I MRN: [id number]  Unit/Bed#:  I Date of Admission: 1/23/2025   Date of Service: 1/23/2025 I Hospital Day: 0     Assessment & Plan   This is a 89 y.o. year old female here for egd, and she is stable and optimized for her procedure.    History of Present Illness    Geneva Lozada is a 89 y.o. year old female who presents for gastric cancer surveillance and known intestinal metaplasia.     REVIEW OF SYSTEMS: Per the HPI, and otherwise unremarkable.    Historical Information   Past Medical History:   Diagnosis Date    Allergies     Ambulates with cane     prn    Anxiety     Asthma     Chronic interstitial cystitis     Colon polyp     Depression     Disease of thyroid gland     GERD (gastroesophageal reflux disease)     Lichen sclerosus et atrophicus     Osteoporosis      Past Surgical History:   Procedure Laterality Date    APPENDECTOMY      BLADDER SURGERY      BREAST BIOPSY Bilateral     several bx's    CATARACT EXTRACTION      COLONOSCOPY  1999    tubular adenoma    COLONOSCOPY  2006    hyperplastic polyp    COLONOSCOPY  2010    tubular adenoma    COLONOSCOPY  2013    normal    COLONOSCOPY  12/15/2017    polyps tubular adenoma, nois coli by Dr. Torres    EGD  05/23/2023    MATEO Peoples MD.- Esophageal plaqaues were found, consistent with candidiasis; a singele gastric polyp; normal duodenum. Bx: Benign cystic fundic gland polyp; neg. for dysplasia; mild chronic inactive antral gastritis; neg. H. pylori; neg. ulceration; abundant fungal hyphae and yeast forms in squamous epithelial sloughs, consistent with Candida species; basal hyperplasia; neg. for Grissom.    EGD  03/01/2022    MATEO Peoples MD.- Normal esophagus; a single polyp; normal duodenum. Bx: polypoid low-grade dysplasia, intestinal type; AE1/AE3 immunohistochemistry is neg. for abberrant staining; neg. for H. pylori.    EGD  01/28/2022    [facility] Peoples MD.- Large gastric polyp post  piece meal removal. Bx: adenocarcinoma arising in an intestinal-type adenoma with high grade dysplasia.    HYSTERECTOMY N/A     when pt was 37    INCISIONAL BREAST BIOPSY Bilateral     3 excisional rt breast 4 left breast all bening many years ago    PARTIAL GASTRECTOMY  2022    Adenoma polyp gastric, no cancer.    REPLACEMENT TOTAL KNEE Left     REPLACEMENT TOTAL KNEE Right     SACRAL NERVE STIMULATOR PLACEMENT N/A 2023    Procedure: IMPLANTATION NEUROSTIMULATOR ELECTRODE ARRAY SACRAL NERVE; INSERTION  NEUROSTIMULATOR; FLUORO; ELECTRONIC ANALYSIS;  Surgeon: Rasta Gaston MD;  Location: [facility];  Service: UroGynecology           TONSILECTOMY AND ADNOIDECTOMY      TOTAL ABDOMINAL HYSTERECTOMY  1972    TOTAL SHOULDER REPLACEMENT Right     and     UPPER GASTROINTESTINAL ENDOSCOPY  10/14/2021    Esophageal candidiasis biopsy positive.  Gastric polyps 1 benign gastric mucosa second tubular adenoma with intestinal metaplasia by Dr. Peoples     Social History     Tobacco Use    Smoking status: Former     Current packs/day: 0.00     Types: Cigarettes     Quit date:      Years since quittin.1    Smokeless tobacco: Never   Vaping Use    Vaping status: Never Used   Substance and Sexual Activity    Alcohol use: No    Drug use: No    Sexual activity: Not Currently     E-Cigarette/Vaping    E-Cigarette Use Never User      E-Cigarette/Vaping Substances    Nicotine No     THC No     CBD No     Flavoring No     Other No     Unknown No          Meds/Allergies     Current Outpatient Medications:     Calcium-Vitamin D-Vitamin K (VIACTIV PO)    diphenhydrAMINE (BENADRYL) 25 mg capsule    DULoxetine (CYMBALTA) 60 mg delayed release capsule    levothyroxine 88 mcg tablet    montelukast (SINGULAIR) 10 mg tablet    Multiple Vitamin (multivitamin) tablet    clobetasol (TEMOVATE) 0.05 % cream    clotrimazole-betamethasone (LOTRISONE) 1-0.05 % cream    Cyanocobalamin (VITAMIN B-12) 500 MCG SUBL    estradiol  "(ESTRACE) 0.1 mg/g vaginal cream    levocetirizine (XYZAL) 5 MG tablet    Melatonin 3 MG CAPS    naproxen sodium (Aleve) 220 MG tablet    polyethylene glycol (MIRALAX) 17 g packet    romosozumab-aqqg (Evenity) subcutaneous injection    Current Facility-Administered Medications:     lactated ringers infusion, 125 mL/hr, Intravenous, Continuous, 125 mL/hr at 01/23/25 0831  Allergies   Allergen Reactions    Iodine - Food Allergy Hives     Face and neck swelling; trouble breathing(unclear if anaphylaxis). Pt reports it happened \"many years ago\" and could not recall with which test it occurred.    Azelastine Blisters    Cephalexin Nausea Only and Vomiting    Citalopram Other (See Comments)    Codeine Other (See Comments)     pt unsure of reaction    Doxycycline GI Intolerance    Erythromycin Diarrhea    Ethanol Diarrhea    Fluoxetine Other (See Comments)    Meloxicam Dizziness    Meperidine      Other reaction(s): Other (See Comments)  hallucinations    Nabumetone Dizziness    Ofloxacin Tachycardia    Other Other (See Comments)     darvocet  darvocet    Penicillins      Other reaction(s): Other (See Comments)  hives-has had keflex outpt    Pregabalin Fatigue    Duloxetine Rash    Estrone Rash       Objective :  Temp:  [97.4 °F (36.3 °C)] 97.4 °F (36.3 °C)  HR:  [78] 78  BP: (127)/(68) 127/68  Resp:  [16] 16  SpO2:  [98 %] 98 %  O2 Device: None (Room air)    Physical Exam  Gen: NAD  Head: NCAT  CV: RRR  CHEST: Clear  ABD: soft, NT/ND  EXT: no edema  "

## 2025-01-30 PROCEDURE — 88342 IMHCHEM/IMCYTCHM 1ST ANTB: CPT | Performed by: PATHOLOGY

## 2025-01-30 PROCEDURE — 88313 SPECIAL STAINS GROUP 2: CPT | Performed by: PATHOLOGY

## 2025-01-30 PROCEDURE — 88341 IMHCHEM/IMCYTCHM EA ADD ANTB: CPT | Performed by: PATHOLOGY

## 2025-01-30 PROCEDURE — 88305 TISSUE EXAM BY PATHOLOGIST: CPT | Performed by: PATHOLOGY

## 2025-02-24 PROBLEM — K31.7 GASTRIC POLYP: Status: ACTIVE | Noted: 2022-01-03

## 2025-02-25 ENCOUNTER — TELEPHONE (OUTPATIENT)
Age: 89
End: 2025-02-25

## 2025-06-04 ENCOUNTER — OFFICE VISIT (OUTPATIENT)
Dept: GYNECOLOGY | Facility: CLINIC | Age: 89
End: 2025-06-04
Payer: MEDICARE

## 2025-06-04 VITALS
HEIGHT: 65 IN | SYSTOLIC BLOOD PRESSURE: 120 MMHG | BODY MASS INDEX: 18.73 KG/M2 | WEIGHT: 112.4 LBS | DIASTOLIC BLOOD PRESSURE: 62 MMHG | HEART RATE: 72 BPM

## 2025-06-04 DIAGNOSIS — Z12.31 ENCOUNTER FOR SCREENING MAMMOGRAM FOR MALIGNANT NEOPLASM OF BREAST: Primary | ICD-10-CM

## 2025-06-04 DIAGNOSIS — E11.51 DIABETES MELLITUS WITH PERIPHERAL CIRCULATORY DISORDER (HCC): ICD-10-CM

## 2025-06-04 DIAGNOSIS — N95.2 ATROPHIC VAGINITIS: ICD-10-CM

## 2025-06-04 PROCEDURE — 99213 OFFICE O/P EST LOW 20 MIN: CPT | Performed by: OBSTETRICS & GYNECOLOGY

## 2025-06-04 RX ORDER — ESTRADIOL 10 UG/1
TABLET, FILM COATED VAGINAL
Qty: 24 TABLET | Refills: 3 | COMMUNITY
Start: 2025-06-04 | End: 2025-06-12

## 2025-06-04 NOTE — PROGRESS NOTES
Name: Geneva Lozada      : 1935      MRN: [id number]  Encounter Provider: Marshall Guzman DO  Encounter Date: 2025   Encounter department: [facility]  :  Assessment & Plan  Encounter for screening mammogram for malignant neoplasm of breast    Orders:    Mammo screening bilateral w 3d and cad; Future    Atrophic vaginitis         Diabetes mellitus with peripheral circulatory disorder (HCC)    Lab Results   Component Value Date    HGBA1C 6.2 (H) 2024                History of Present Illness   HPI patient presents complaining of vaginal burning and irritation.  She was treated for a yeast infection couple months ago with fluconazole.  She denies any vaginal discharge or bleeding.  She has been unable to insert the Estrace cream due to applicator size.    Geneva Lozada is a 89 y.o. female      Review of Systems  Past Medical History   Past Medical History[1]  Past Surgical History[2]  Family History[3]   reports that she quit smoking about 68 years ago. Her smoking use included cigarettes. She has never used smokeless tobacco. She reports that she does not drink alcohol and does not use drugs.  Current Outpatient Medications   Medication Instructions    Calcium-Vitamin D-Vitamin K (VIACTIV PO) Daily    clobetasol (TEMOVATE) 0.05 % cream Topical, 2 times daily, Use in thin layer once weekly    clotrimazole-betamethasone (LOTRISONE) 1-0.05 % cream Topical, 2 times daily    Cyanocobalamin (VITAMIN B-12) 500 MCG SUBL Daily    diphenhydrAMINE (BENADRYL) 50 mg, Daily    DULoxetine (CYMBALTA) 60 mg, Daily    estradiol (VAGIFEM, YUVAFEM) 10 MCG TABS vaginal tablet 1 tablet vaginally nightly for 14 days then twice weekly    Evenity 210 mg, Every 30 days    ferrous sulfate 325 mg    levocetirizine (XYZAL) 5 mg, Every evening    levothyroxine 88 mcg, Daily    Melatonin 10 mg, Daily    montelukast (SINGULAIR) 10 mg, Daily    Multiple Vitamin (multivitamin) tablet 1  "tablet, Daily    naproxen sodium (ALEVE) 440 mg, Daily    polyethylene glycol (MIRALAX) 17 g, Daily   Allergies[4]   Medications Ordered Prior to Encounter[5]   Social History[6]     Objective   /62 (BP Location: Left arm, Patient Position: Sitting, Cuff Size: Standard)   Pulse 72   Ht 5' 5\" (1.651 m)   Wt 51 kg (112 lb 6.4 oz)   BMI 18.70 kg/m²      Physical Exam  Vitals reviewed.   Abdominal:      General: There is no distension.      Palpations: Abdomen is soft. There is no mass.      Tenderness: There is no abdominal tenderness. There is no guarding or rebound.      Hernia: No hernia is present. There is no hernia in the left inguinal area or right inguinal area.   Genitourinary:     General: Normal vulva.      Labia:         Right: No rash, tenderness or lesion.         Left: No rash, tenderness or lesion.       Comments: Significant atrophic changes.  Uterus and cervix surgically absent.  No palpable adnexal masses or tenderness.  Urethral orifice normal.  [place]'s and [place]'s glands normal.  No cystocele or rectocele.  Lymphadenopathy:      Lower Body: No right inguinal adenopathy. No left inguinal adenopathy.     Neurological:      Mental Status: She is alert.                [1]   Past Medical History:  Diagnosis Date    Allergies     Ambulates with cane     prn    Anxiety     Asthma     Chronic interstitial cystitis     Colon polyp     Depression     Disease of thyroid gland     GERD (gastroesophageal reflux disease)     Lichen sclerosus et atrophicus     Osteoporosis    [2]   Past Surgical History:  Procedure Laterality Date    APPENDECTOMY      BLADDER SURGERY      BREAST BIOPSY Bilateral     several bx's    CATARACT EXTRACTION      COLONOSCOPY  1999    tubular adenoma    COLONOSCOPY  2006    hyperplastic polyp    COLONOSCOPY  2010    tubular adenoma    COLONOSCOPY  2013    normal    COLONOSCOPY  12/15/2017    polyps tubular adenoma, nois coli by Dr. Torres    EGD  05/23/2023    [facility] " MD Richelle.- Esophageal plaqaues were found, consistent with candidiasis; a singele gastric polyp; normal duodenum. Bx: Benign cystic fundic gland polyp; neg. for dysplasia; mild chronic inactive antral gastritis; neg. H. pylori; neg. ulceration; abundant fungal hyphae and yeast forms in squamous epithelial sloughs, consistent with Candida species; basal hyperplasia; neg. for Grissom.    [facility]20[facility] Normal esophagus; a single polyp; normal duodenum. Bx: polypoid low-grade dysplasia, intestinal type; AE1/AE3 immunohistochemistry is neg. for abberrant staining; neg. for H. pylori.    [facility]20[facility] Large gastric polyp post piece meal removal. Bx: adenocarcinoma arising in an intestinal-type adenoma with high grade dysplasia.    EGD  01/25/2025    Stomach, Antrum Bx Antral-type gastric mucosa chronic inactive gastritis and focal intestinal metaplasia. Neg for dysplasia.Stomach, Body, Bx Favor foveolar hyperplasia w reactive changes and pancreatic acinar metaplasia/heterotopia. Stomach, Fundus(Polyp)Bx Fundic gland polyp low-grade dysplasia. EGJunc, Bx Squamous cardiac-type gastric mucosa NS chronic inflam, neg for dysplasia and metaplasia    HYSTERECTOMY N/A     when pt was 37    INCISIONAL BREAST BIOPSY Bilateral     3 excisional rt breast 4 left breast all bening many years ago    PARTIAL GASTRECTOMY  04/2022    Adenoma polyp gastric, no cancer.    REPLACEMENT TOTAL KNEE Left 2008    REPLACEMENT TOTAL KNEE Right 2010    SACRAL NERVE STIMULATOR PLACEMENT N/A 12/27/2023    Procedure: IMPLANTATION NEUROSTIMULATOR ELECTRODE ARRAY SACRAL NERVE; INSERTION  NEUROSTIMULATOR; FLUORO; ELECTRONIC ANALYSIS;  Surgeon: Rasta Gaston MD;  Location: [facility];  Service: UroGynecology           TONSILECTOMY AND ADNOIDECTOMY      TOTAL ABDOMINAL HYSTERECTOMY  1972    TOTAL SHOULDER REPLACEMENT Right 2012    and 2017    UPPER GASTROINTESTINAL ENDOSCOPY  10/14/2021    Esophageal  "candidiasis biopsy positive.  Gastric polyps 1 benign gastric mucosa second tubular adenoma with intestinal metaplasia by Dr. Peoples   [3]   Family History  Problem Relation Name Age of Onset    Heart attack Mother      Breast cancer Mother          over 50    Heart disease Father      No Known Problems Sister      No Known Problems Maternal Grandmother      No Known Problems Maternal Grandfather      No Known Problems Paternal Grandmother      No Known Problems Paternal Grandfather      No Known Problems Daughter      No Known Problems Maternal Aunt      No Known Problems Paternal Aunt      No Known Problems Paternal Aunt      Breast cancer Cousin mat         under 50   [4]   Allergies  Allergen Reactions    Iodine - Food Allergy Hives     Face and neck swelling; trouble breathing(unclear if anaphylaxis). Pt reports it happened \"many years ago\" and could not recall with which test it occurred.    Azelastine Blisters    Cephalexin Nausea Only and Vomiting    Citalopram Other (See Comments)    Codeine Other (See Comments)     pt unsure of reaction    Doxycycline GI Intolerance    Erythromycin Diarrhea    Ethanol Diarrhea    Fluoxetine Other (See Comments)    Meloxicam Dizziness    Meperidine      Other reaction(s): Other (See Comments)  hallucinations    Nabumetone Dizziness    Ofloxacin Tachycardia    Other Other (See Comments)     darvocet  darvocet    Penicillins      Other reaction(s): Other (See Comments)  hives-has had keflex outpt    Pregabalin Fatigue    Duloxetine Rash    Estrone Rash   [5]   Current Outpatient Medications on File Prior to Visit   Medication Sig Dispense Refill    estradiol (VAGIFEM, YUVAFEM) 10 MCG TABS vaginal tablet 1 tablet vaginally nightly for 14 days then twice weekly 24 tablet 3    Calcium-Vitamin D-Vitamin K (VIACTIV PO) Take by mouth in the morning      clobetasol (TEMOVATE) 0.05 % cream Apply topically 2 (two) times a day Use in thin layer once weekly 60 g 1    " clotrimazole-betamethasone (LOTRISONE) 1-0.05 % cream Apply topically 2 (two) times a day (Patient not taking: Reported on 2025) 45 g 1    Cyanocobalamin (VITAMIN B-12) 500 MCG SUBL Place under the tongue in the morning      diphenhydrAMINE (BENADRYL) 25 mg capsule Take 50 mg by mouth daily      DULoxetine (CYMBALTA) 60 mg delayed release capsule Take 60 mg by mouth daily      ferrous sulfate 325 (65 Fe) mg tablet Take 325 mg by mouth Mon/Wed/Fri      levocetirizine (XYZAL) 5 MG tablet Take 5 mg by mouth every evening      levothyroxine 88 mcg tablet Take 88 mcg by mouth daily      Melatonin 3 MG CAPS Take 10 mg by mouth daily (Patient not taking: Reported on 2025)      montelukast (SINGULAIR) 10 mg tablet Take 10 mg by mouth in the morning      Multiple Vitamin (multivitamin) tablet Take 1 tablet by mouth daily      naproxen sodium (Aleve) 220 MG tablet Take 440 mg by mouth daily      polyethylene glycol (MIRALAX) 17 g packet Take 17 g by mouth daily      romosozumab-aqqg (Evenity) subcutaneous injection Inject 210 mg under the skin every 30 (thirty) days      [DISCONTINUED] estradiol (ESTRACE) 0.1 mg/g vaginal cream Insert 1 g into the vagina once a week 42.5 g 3     No current facility-administered medications on file prior to visit.   [6]   Social History  Tobacco Use    Smoking status: Former     Current packs/day: 0.00     Types: Cigarettes     Quit date:      Years since quittin.4    Smokeless tobacco: Never   Vaping Use    Vaping status: Never Used   Substance and Sexual Activity    Alcohol use: No    Drug use: No    Sexual activity: Not Currently

## 2025-06-09 ENCOUNTER — TELEPHONE (OUTPATIENT)
Age: 89
End: 2025-06-09

## 2025-06-09 DIAGNOSIS — N95.2 ATROPHIC VAGINITIS: Primary | ICD-10-CM

## 2025-06-09 RX ORDER — ESTRADIOL 10 UG/1
1 TABLET, FILM COATED VAGINAL DAILY
Qty: 24 TABLET | Refills: 3 | Status: SHIPPED | OUTPATIENT
Start: 2025-06-09 | End: 2025-07-03

## 2025-06-09 NOTE — TELEPHONE ENCOUNTER
Pt checking on rx for Vagifem, not at [facility] pharmacy, would like rx sent asap if possible

## 2025-06-12 ENCOUNTER — TELEPHONE (OUTPATIENT)
Age: 89
End: 2025-06-12

## 2025-06-12 NOTE — TELEPHONE ENCOUNTER
Patient called she states Dr Guzman gave her an RX for estrace pills (vagifem vaginal pill)- they are too expensive $300 for 30 days - patient got 1 week supply - she has a hard time with using the applicator - the pill won't eject from the thing   Patient wants to switch to the cream , but needs a smaller applicator   Please call patient when sent to pharmacy

## 2025-06-13 NOTE — TELEPHONE ENCOUNTER
LV for pt's daughter to have Geneva call back the office. Pt does not need to use applicator, she can apply to finger tip and insert with finger into the vagina. The applicator can be difficult to use and a little messy, using a finger is the best way. Message can be relayed to patient if she calls back

## 2025-06-13 NOTE — TELEPHONE ENCOUNTER
Pt returned call regarding medication. Confirmed medication was sent to Pharmacy on file and reviewed application instructions, as indicated in previous note. Pt has no further questions at the moment.

## 2025-07-16 ENCOUNTER — HOSPITAL ENCOUNTER (OUTPATIENT)
Dept: MAMMOGRAPHY | Facility: MEDICAL CENTER | Age: 89
Discharge: HOME | End: 2025-07-16
Attending: OBSTETRICS & GYNECOLOGY
Payer: MEDICARE

## 2025-07-16 VITALS — BODY MASS INDEX: 18.66 KG/M2 | HEIGHT: 65 IN | WEIGHT: 112 LBS

## 2025-07-16 DIAGNOSIS — Z12.31 ENCOUNTER FOR SCREENING MAMMOGRAM FOR MALIGNANT NEOPLASM OF BREAST: ICD-10-CM

## 2025-07-16 PROCEDURE — 77063 BREAST TOMOSYNTHESIS BI: CPT

## 2025-07-16 PROCEDURE — 77067 SCR MAMMO BI INCL CAD: CPT

## (undated) DEVICE — SCD SEQUENTIAL COMPRESSION COMFORT SLEEVE MEDIUM KNEE LENGTH: Brand: KENDALL SCD

## (undated) DEVICE — INTENDED FOR TISSUE SEPARATION, AND OTHER PROCEDURES THAT REQUIRE A SHARP SURGICAL BLADE TO PUNCTURE OR CUT.: Brand: BARD-PARKER SAFETY BLADES SIZE 15, STERILE

## (undated) DEVICE — INTENDED FOR TISSUE SEPARATION, AND OTHER PROCEDURES THAT REQUIRE A SHARP SURGICAL BLADE TO PUNCTURE OR CUT.: Brand: BARD-PARKER SAFETY BLADES SIZE 11, STERILE

## (undated) DEVICE — 3M™ TEGADERM™ TRANSPARENT FILM DRESSING FRAME STYLE, 1626W, 4 IN X 4-3/4 IN (10 CM X 12 CM), 50/CT 4CT/CASE: Brand: 3M™ TEGADERM™

## (undated) DEVICE — CHEST/BREAST DRAPE: Brand: CONVERTORS

## (undated) DEVICE — DRAPE C-ARM X-RAY

## (undated) DEVICE — GLOVE PI ULTRA TOUCH SZ.7.5

## (undated) DEVICE — SUT MONOCRYL 3-0 PS-2 27 IN Y427H

## (undated) DEVICE — NEEDLE HYPO 22G X 1-1/2 IN

## (undated) DEVICE — SKIN MARKER DUAL TIP WITH RULER CAP, FLEXIBLE RULER AND LABELS: Brand: DEVON

## (undated) DEVICE — 3M™ STERI-DRAPE™ INCISE DRAPE 1050 (60CM X 45CM): Brand: STERI-DRAPE™

## (undated) DEVICE — LEAD IMPLANT KIT: Brand: AXONICS

## (undated) DEVICE — REMOTE CONTROL: Brand: AXONICS

## (undated) DEVICE — DRAPE EQUIPMENT RF WAND

## (undated) DEVICE — PENCIL ELECTROSURG E-Z CLEAN -0035H

## (undated) DEVICE — 2000CC GUARDIAN II: Brand: GUARDIAN

## (undated) DEVICE — MEDI-VAC YANKAUER SUCTION HANDLE W/BULBOUS AND CONTROL VENT: Brand: CARDINAL HEALTH

## (undated) DEVICE — SYRINGE 20ML LL

## (undated) DEVICE — PROVE COVER: Brand: UNBRANDED

## (undated) DEVICE — SUT SILK 2-0 SH 30 IN K833H

## (undated) DEVICE — SPONGE STICK WITH PVP-I: Brand: KENDALL

## (undated) DEVICE — GAUZE SPONGES,16 PLY: Brand: CURITY

## (undated) DEVICE — BETHLEHEM UNIVERSAL MINOR GEN: Brand: CARDINAL HEALTH

## (undated) DEVICE — CHARGING SYSTEM: Brand: AXONICS

## (undated) DEVICE — TUBING SUCTION 5MM X 12 FT